# Patient Record
Sex: MALE | Race: WHITE | NOT HISPANIC OR LATINO | Employment: FULL TIME | URBAN - METROPOLITAN AREA
[De-identification: names, ages, dates, MRNs, and addresses within clinical notes are randomized per-mention and may not be internally consistent; named-entity substitution may affect disease eponyms.]

---

## 2017-04-09 ENCOUNTER — HOSPITAL ENCOUNTER (EMERGENCY)
Facility: CLINIC | Age: 57
Discharge: HOME | End: 2017-04-09
Attending: EMERGENCY MEDICINE | Admitting: EMERGENCY MEDICINE

## 2017-04-09 ENCOUNTER — RECORDS - HEALTHEAST (OUTPATIENT)
Dept: ADMINISTRATIVE | Facility: OTHER | Age: 57
End: 2017-04-09

## 2017-04-09 VITALS
OXYGEN SATURATION: 93 % | SYSTOLIC BLOOD PRESSURE: 116 MMHG | TEMPERATURE: 98.1 F | BODY MASS INDEX: 21.4 KG/M2 | DIASTOLIC BLOOD PRESSURE: 77 MMHG | WEIGHT: 160 LBS | RESPIRATION RATE: 18 BRPM

## 2017-04-09 DIAGNOSIS — S01.81XA FACIAL LACERATION, INITIAL ENCOUNTER: ICD-10-CM

## 2017-04-09 PROCEDURE — 12011 RPR F/E/E/N/L/M 2.5 CM/<: CPT | Performed by: EMERGENCY MEDICINE

## 2017-04-09 PROCEDURE — 90471 IMMUNIZATION ADMIN: CPT

## 2017-04-09 PROCEDURE — 90715 TDAP VACCINE 7 YRS/> IM: CPT | Performed by: EMERGENCY MEDICINE

## 2017-04-09 PROCEDURE — 99282 EMERGENCY DEPT VISIT SF MDM: CPT | Mod: 25

## 2017-04-09 PROCEDURE — 12011 RPR F/E/E/N/L/M 2.5 CM/<: CPT

## 2017-04-09 PROCEDURE — 27210282 ZZH ADHESIVE DERMABOND SKIN

## 2017-04-09 PROCEDURE — 99282 EMERGENCY DEPT VISIT SF MDM: CPT | Mod: 25 | Performed by: EMERGENCY MEDICINE

## 2017-04-09 PROCEDURE — 25000125 ZZHC RX 250: Performed by: EMERGENCY MEDICINE

## 2017-04-09 RX ADMIN — CLOSTRIDIUM TETANI TOXOID ANTIGEN (FORMALDEHYDE INACTIVATED), CORYNEBACTERIUM DIPHTHERIAE TOXOID ANTIGEN (FORMALDEHYDE INACTIVATED), BORDETELLA PERTUSSIS TOXOID ANTIGEN (GLUTARALDEHYDE INACTIVATED), BORDETELLA PERTUSSIS FILAMENTOUS HEMAGGLUTININ ANTIGEN (FORMALDEHYDE INACTIVATED), BORDETELLA PERTUSSIS PERTACTIN ANTIGEN, AND BORDETELLA PERTUSSIS FIMBRIAE 2/3 ANTIGEN 0.5 ML: 5; 2; 2.5; 5; 3; 5 INJECTION, SUSPENSION INTRAMUSCULAR at 22:26

## 2017-04-09 NOTE — ED AVS SNAPSHOT
[facility] Emergency Department    [address] [address]    Phone:  [phone]    Fax:  [phone]                                       Ronal Talley   MRN: [id number]    Department:  [facility] Emergency Department   Date of Visit:  4/9/2017           Patient Information     Date Of Birth          1960        Your diagnoses for this visit were:     Facial laceration, initial encounter        You were seen by Wally Lazo MD and Jason Gimenez MD.        Discharge Instructions         Face Laceration: Skin Glue  A laceration is a cut through the skin. A laceration on your face has been closed with skin glue. This is used on cuts that have smooth edges and are not infected. In some cases, a lower layer of skin may be sutured before skin glue is put on. The skin glue closes the cut within a few minutes. It also provides a water-resistant cover. No bandage is needed. Skin glue peels off on its own within 5 to 10 days.     Home care    Your healthcare provider may prescribe an antibiotic. This is to help prevent infection. Follow all instructions for taking this medicine. Take the medicine every day until it is gone or you are told to stop. You should not have any left over.    The healthcare provider may prescribe medicines for pain. Follow instructions for taking them.    Follow the healthcare provider s instructions on how to care for the cut.    Keep the wound clean and dry. You may shower or bathe as usual, but do not use soaps, lotions, or ointments on the wound area. Do not scrub the wound. After bathing, pat the wound dry with a soft towel. Avoid soaking the cut in water.    Do not scratch, rub, or pick at the film. Do not place tape directly over the film.    Do not apply liquids (such as peroxide), ointments, or creams to the wound while the film is in place.    Most facial skin wounds heal without problems. However, an infection sometimes occurs despite  proper treatment. Therefore, watch for the signs of infection listed below.  Follow-up care  Follow up with your health care provider as advised. Stitches should be removed from the face within 5 days. Stitches and staples should be removed from other parts of the body within 7-14 days. If dissolving stitches were used in the mouth, these will fall out or dissolve without the need for removal. If tape closures were used, remove them yourself if they have not fallen off after 7 days. If skin glue was used, the film will fall off by itself in 5-10 days. Notify your healthcare provider if you notice persistent numbness or weakness in the injured hand.  When to seek medical advice  Call your health care provider right away if any of these occur:    Wound bleeds more than a small amount or bleeding doesn't stop    Signs of infection:    Increasing pain in the wound    Increasing wound redness or swelling    Pus or bad odor coming from the wound    Fever of 100.4 F (38. C) or as directed by your healthcare provider    Wound edges re-open    1611-4995 [employer]. [address] [place] [address]. All rights reserved. This information is not intended as a substitute for professional medical care. Always follow your healthcare professional's instructions.          24 Hour Appointment Hotline       To make an appointment at any [facility], call [phone] ([phone]). If you don't have a family doctor or clinic, we will help you find one. [facility] clinics are conveniently located to serve the needs of you and your family.             Review of your medicines      Our records show that you are taking the medicines listed below. If these are incorrect, please call your family doctor or clinic.        Dose / Directions Last dose taken    NO ACTIVE MEDICATIONS        .   Refills:  0        PERCOCET  MG per tablet   Dose:  1 tablet   Generic drug:  oxyCODONE-acetaminophen        take 1  "Tab by mouth every 4 hours as needed.   Refills:  0                Orders Needing Specimen Collection     None      Pending Results     No orders found from 2017 to 4/10/2017.            Pending Culture Results     No orders found from 2017 to 4/10/2017.            Test Results From Your Hospital Stay               Thank you for choosing [facility]       Thank you for choosing [facility] for your care. Our goal is always to provide you with excellent care. Hearing back from our patients is one way we can continue to improve our services. Please take a few minutes to complete the written survey that you may receive in the mail after you visit with us. Thank you!        [employer] Information     [employer] lets you send messages to your doctor, view your test results, renew your prescriptions, schedule appointments and more. To sign up, go to [email or web address] . Click on \"Log in\" on the left side of the screen, which will take you to the Welcome page. Then click on \"Sign up Now\" on the right side of the page.     You will be asked to enter the access code listed below, as well as some personal information. Please follow the directions to create your username and password.     Your access code is: [email or web address]  Expires: 2017 10:37 PM     Your access code will  in 90 days. If you need help or a new code, please call your [facility] clinic or [phone].        Care EveryWhere ID     This is your Care EveryWhere ID. This could be used by other organizations to access your [facility] medical records  [id number]        After Visit Summary       This is your record. Keep this with you and show to your community pharmacist(s) and doctor(s) at your next visit.                  "

## 2017-04-09 NOTE — ED AVS SNAPSHOT
[facility] Emergency Department    [address] [address]    Phone:  [phone]    Fax:  [phone]                                       Ronal Talley   MRN: [id number]    Department:  [facility] Emergency Department   Date of Visit:  4/9/2017           After Visit Summary Signature Page     I have received my discharge instructions, and my questions have been answered. I have discussed any challenges I see with this plan with the nurse or doctor.    ..........................................................................................................................................  Patient/Patient Representative Signature      ..........................................................................................................................................  Patient Representative Print Name and Relationship to Patient    ..................................................               ................................................  Date                                            Time    ..........................................................................................................................................  Reviewed by Signature/Title    ...................................................              ..............................................  Date                                                            Time

## 2017-04-10 ASSESSMENT — ENCOUNTER SYMPTOMS
WOUND: 1
EYE PAIN: 0
HEADACHES: 0
EYE REDNESS: 0

## 2017-04-10 NOTE — ED PROVIDER NOTES
History     Chief Complaint   Patient presents with     Facial Laceration     laceration over left eye hitting head on storage bins, no loc.     HPI  Ronal Talley is a 56 year old male who tripped and fell landing on a round plastic bucket sustaining a left eyebrow area laceration.  No blunt eye trauma.  Eye glasses were not broken.  No LOC.  No neck or back pain or injury.    Tetanus immunization status: Not up-to-date.    I have reviewed the Medications, Allergies, Past Medical and Surgical History, and Social History in the Epic system.  Patient Active Problem List   Diagnosis     FAMILY HISTORY OF PROSTATE MALIGNANCY     Headache     Impingement syndrome, shoulder     Onychomycosis     OA (osteoarthritis)     CARDIOVASCULAR SCREENING; LDL GOAL LESS THAN 160     Past Medical History:   Diagnosis Date     NO ACTIVE PROBLEMS      Past Surgical History:   Procedure Laterality Date     NO HISTORY OF SURGERY       No current facility-administered medications for this encounter.      Current Outpatient Prescriptions   Medication     oxycodone-acetaminophen (PERCOCET)  MG per tablet     NO ACTIVE MEDICATIONS     Allergies   Allergen Reactions     Cipro [Ciprofloxacin] Rash     Penicillins Anaphylaxis     Social History   Substance Use Topics     Smoking status: Never Smoker     Smokeless tobacco: Not on file     Alcohol use Yes      Comment: occasionally     Family History   Problem Relation Age of Onset     C.A.D. No family hx of      DIABETES No family hx of      CEREBROVASCULAR DISEASE No family hx of      Cancer - colorectal No family hx of      Hypertension Mother      Breast Cancer Mother      Prostate Cancer Father        Review of Systems   Eyes: Negative for pain, redness and visual disturbance.   Skin: Positive for wound (left eyebrow laceration). Negative for pallor.   Neurological: Negative for headaches.       Physical Exam   BP: 116/77  Heart Rate: 64  Temp: 98.1  F (36.7  C)  Resp: 18  Weight:  72.6 kg (160 lb)  SpO2: 93 %  Physical Exam   Constitutional: He is oriented to person, place, and time. He appears well-developed and well-nourished. No distress.   HENT:   Head: Normocephalic. Head is with laceration (1.5 cm partial-thickness left eyebrow laceration, no associated eye/globe trauma). Head is without raccoon's eyes, without Curiel's sign, without abrasion and without contusion.   Eyes: Conjunctivae and EOM are normal. Pupils are equal, round, and reactive to light. No scleral icterus.       Neck: Normal range of motion. Neck supple.   Neurological: He is alert and oriented to person, place, and time. GCS eye subscore is 4. GCS verbal subscore is 5. GCS motor subscore is 6.   Nursing note and vitals reviewed.      ED Course     ED Course     Laceration repair  Performed by: CAIO NARANJO  Authorized by: CAIO NARANJO   Consent: Verbal consent obtained.  Risks and benefits: risks, benefits and alternatives were discussed  Consent given by: patient  Patient understanding: patient states understanding of the procedure being performed  Patient consent: the patient's understanding of the procedure matches consent given  Laceration length: 1.5 cm  Foreign bodies: no foreign bodies  Preparation: Patient was prepped and draped in the usual sterile fashion.  Irrigation solution: saline  Amount of cleaning: standard  Debridement: none  Degree of undermining: none  Skin closure: glue  Approximation: close  Approximation difficulty: simple  Patient tolerance: Patient tolerated the procedure well with no immediate complications                     Labs Ordered and Resulted from Time of ED Arrival Up to the Time of Departure from the ED - No data to display    Medications   Tdap (tetanus-diphtheria-acell pertussis) (ADACEL) injection 0.5 mL (0.5 mLs Intramuscular Given 4/9/17 6519)       Assessments & Plan (with Medical Decision Making)   Left eyebrow area laceration without blunt eye/globe trauma.  Wound  closed with glue, tetanus booster updated, neurologically intact, GCS 15. Patient was provided instructions for supportive care and will return as needed for worsened condition or worsening symptoms, or new problems or concerns.    I have reviewed the nursing notes.    I have reviewed the findings, diagnosis, plan and need for follow up with the patient.    Discharge Medication List as of 4/9/2017 10:38 PM          Final diagnoses:   Facial laceration, initial encounter       4/9/2017   [facility] EMERGENCY DEPARTMENT     Jason Gimenez MD  04/10/17 0121

## 2017-04-10 NOTE — DISCHARGE INSTRUCTIONS
Face Laceration: Skin Glue  A laceration is a cut through the skin. A laceration on your face has been closed with skin glue. This is used on cuts that have smooth edges and are not infected. In some cases, a lower layer of skin may be sutured before skin glue is put on. The skin glue closes the cut within a few minutes. It also provides a water-resistant cover. No bandage is needed. Skin glue peels off on its own within 5 to 10 days.     Home care    Your healthcare provider may prescribe an antibiotic. This is to help prevent infection. Follow all instructions for taking this medicine. Take the medicine every day until it is gone or you are told to stop. You should not have any left over.    The healthcare provider may prescribe medicines for pain. Follow instructions for taking them.    Follow the healthcare provider s instructions on how to care for the cut.    Keep the wound clean and dry. You may shower or bathe as usual, but do not use soaps, lotions, or ointments on the wound area. Do not scrub the wound. After bathing, pat the wound dry with a soft towel. Avoid soaking the cut in water.    Do not scratch, rub, or pick at the film. Do not place tape directly over the film.    Do not apply liquids (such as peroxide), ointments, or creams to the wound while the film is in place.    Most facial skin wounds heal without problems. However, an infection sometimes occurs despite proper treatment. Therefore, watch for the signs of infection listed below.  Follow-up care  Follow up with your health care provider as advised. Stitches should be removed from the face within 5 days. Stitches and staples should be removed from other parts of the body within 7-14 days. If dissolving stitches were used in the mouth, these will fall out or dissolve without the need for removal. If tape closures were used, remove them yourself if they have not fallen off after 7 days. If skin glue was used, the film will fall off by itself  in 5-10 days. Notify your healthcare provider if you notice persistent numbness or weakness in the injured hand.  When to seek medical advice  Call your health care provider right away if any of these occur:    Wound bleeds more than a small amount or bleeding doesn't stop    Signs of infection:    Increasing pain in the wound    Increasing wound redness or swelling    Pus or bad odor coming from the wound    Fever of 100.4 F (38. C) or as directed by your healthcare provider    Wound edges re-open    7558-0428 [employer]. [address] [place], [place] [id number]. All rights reserved. This information is not intended as a substitute for professional medical care. Always follow your healthcare professional's instructions.

## 2018-12-18 ENCOUNTER — RECORDS - HEALTHEAST (OUTPATIENT)
Dept: ADMINISTRATIVE | Facility: OTHER | Age: 58
End: 2018-12-18

## 2019-11-12 ENCOUNTER — OFFICE VISIT (OUTPATIENT)
Dept: FAMILY MEDICINE | Facility: CLINIC | Age: 59
End: 2019-11-12
Payer: COMMERCIAL

## 2019-11-12 VITALS
DIASTOLIC BLOOD PRESSURE: 76 MMHG | HEIGHT: 73 IN | WEIGHT: 160 LBS | OXYGEN SATURATION: 98 % | SYSTOLIC BLOOD PRESSURE: 118 MMHG | TEMPERATURE: 96.5 F | HEART RATE: 58 BPM | BODY MASS INDEX: 21.2 KG/M2

## 2019-11-12 DIAGNOSIS — M25.579 ARTHRALGIA OF FOOT, UNSPECIFIED LATERALITY: ICD-10-CM

## 2019-11-12 DIAGNOSIS — Z12.11 SPECIAL SCREENING FOR MALIGNANT NEOPLASMS, COLON: ICD-10-CM

## 2019-11-12 DIAGNOSIS — M54.2 NECK PAIN: Primary | ICD-10-CM

## 2019-11-12 DIAGNOSIS — Z13.6 CARDIOVASCULAR SCREENING; LDL GOAL LESS THAN 100: ICD-10-CM

## 2019-11-12 DIAGNOSIS — Z12.5 SCREENING FOR PROSTATE CANCER: ICD-10-CM

## 2019-11-12 DIAGNOSIS — M62.89 MUSCLE TIGHTNESS: ICD-10-CM

## 2019-11-12 DIAGNOSIS — Z97.4 WEARS HEARING AID IN BOTH EARS: ICD-10-CM

## 2019-11-12 DIAGNOSIS — Z13.1 SCREENING FOR DIABETES MELLITUS: ICD-10-CM

## 2019-11-12 LAB
CHOLEST SERPL-MCNC: 187 MG/DL
GLUCOSE SERPL-MCNC: 104 MG/DL (ref 70–99)
HDLC SERPL-MCNC: 80 MG/DL
LDLC SERPL CALC-MCNC: 97 MG/DL
NONHDLC SERPL-MCNC: 107 MG/DL
PSA SERPL-ACNC: 0.6 UG/L (ref 0–4)
TRIGL SERPL-MCNC: 51 MG/DL
URATE SERPL-MCNC: 3.6 MG/DL (ref 3.5–7.2)

## 2019-11-12 PROCEDURE — 80061 LIPID PANEL: CPT | Performed by: FAMILY MEDICINE

## 2019-11-12 PROCEDURE — 36415 COLL VENOUS BLD VENIPUNCTURE: CPT | Performed by: FAMILY MEDICINE

## 2019-11-12 PROCEDURE — 99203 OFFICE O/P NEW LOW 30 MIN: CPT | Performed by: FAMILY MEDICINE

## 2019-11-12 PROCEDURE — G0103 PSA SCREENING: HCPCS | Performed by: FAMILY MEDICINE

## 2019-11-12 PROCEDURE — 84550 ASSAY OF BLOOD/URIC ACID: CPT | Performed by: FAMILY MEDICINE

## 2019-11-12 PROCEDURE — 82947 ASSAY GLUCOSE BLOOD QUANT: CPT | Performed by: FAMILY MEDICINE

## 2019-11-12 RX ORDER — CYCLOBENZAPRINE HCL 10 MG
10 TABLET ORAL 3 TIMES DAILY PRN
Qty: 30 TABLET | Refills: 1 | Status: SHIPPED | OUTPATIENT
Start: 2019-11-12 | End: 2022-04-15

## 2019-11-12 RX ORDER — INDOMETHACIN 50 MG/1
50 CAPSULE ORAL
Qty: 30 CAPSULE | Refills: 3 | Status: SHIPPED | OUTPATIENT
Start: 2019-11-12 | End: 2022-04-15

## 2019-11-12 ASSESSMENT — MIFFLIN-ST. JEOR: SCORE: 1576.7

## 2019-11-12 NOTE — PROGRESS NOTES
"Subjective     Ronal Talley is a 59 year old male who presents to clinic today for the following health issues:  Chief Complaint   Patient presents with     New Patient     Neck Pain     Gout     left gtr toe. Had a bad flare this summer. He did some diet changes-pain is better now.     Health Maintenance     due for colonoscopy.  Declined flu vaccine when offered     Patient Request     wants PSA-father had prostate cancer.  Is also fasting.     He felt like he had a gout flare up in his toe.  Felt and looking like when he researched it.  HPI   Neck Pain      Duration: September-acute flare    Description:  Location: mostly on the left side of neck   Radiation: into the left neck and nto the left shoulder    Intensity:  8-9/10 with a wrong move    Accompanying signs and symptoms: none    History (similar episodes/previous evaluation): nothing recent.     Precipitating or alleviating factors: None    Therapies tried and outcome: ice and heat. Stretching and Tylenol and ibuprofen. With little help    Started out of the blue.  No trauma no overuse.  He reached down and it triggered the pain in the left side of his neck.  It has not gone away.  Having issues with turing his head to the right.  Some times radiates up and down.  No radiation into the shoulders.  Has pain with activity.  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 No neck trauma.  He is very activity.    No other numbness tingling or weakness.      Reviewed and updated as needed this visit by Provider         Review of Systems   Review Of Systems  Skin:   Eyes:   Ears/Nose/Throat:   Respiratory: neg  Cardiovascular: neg  Gastrointestinal:   Genitourinary:   Musculoskeletal: as above  Neurologic: as above  Psychiatric: negative  Hematologic/Lymphatic/Immunologic:   Endocrine:         Objective    /76 (Cuff Size: Adult Regular)   Pulse 58   Temp 96.5  F (35.8  C) (Tympanic)   Ht 1.842 m (6' 0.5\")   Wt 72.6 kg (160 lb)   SpO2 98%   BMI 21.40 kg/m    Body " mass index is 21.4 kg/m .  Physical Exam   GENERAL APPEARANCE: healthy, alert and no distress  RESP: lungs clear to auscultation - no rales, rhonchi or wheezes  CV: regular rates and rhythm, normal S1 S2, no S3 or S4 and no murmur, click or rub  MS: has decrease rom with rotation to the right, has full flexion and extension  SKIN: no suspicious lesions or rashes  NEURO: reflexes 2+ symmetric bilaterally at the biceps, triceps and brachioradialis    PSYCH: mentation appears normal and affect normal/bright            Assessment & Plan     (M54.2) Neck pain  (primary encounter diagnosis)  Comment: at this point going to do PT, use medication, and if not better will get imaging  Plan: PHYSICAL THERAPY REFERRAL, cyclobenzaprine         (FLEXERIL) 10 MG tablet            (M62.89) Muscle tightness  Comment: use muscle relaxant  Plan: cyclobenzaprine (FLEXERIL) 10 MG tablet            (M25.579) Arthralgia of foot, unspecified laterality  Comment: use med for flare up and check a uric acid to see if high or high normal  Plan: indomethacin (INDOCIN) 50 MG capsule, Uric acid        He already has read up on dietary changes    (Z13.6) CARDIOVASCULAR SCREENING; LDL GOAL LESS THAN 100  Comment:   Plan: Lipid panel reflex to direct LDL Fasting            (Z13.1) Screening for diabetes mellitus  Comment:   Plan: Glucose            (Z12.11) Special screening for malignant neoplasms, colon  Comment:   Plan: GASTROENTEROLOGY ADULT REF PROCEDURE ONLY [facility] [phone]; [facility]         Surgeon            (Z12.5) Screening for prostate cancer  Comment:   Plan: Prostate spec antigen screen                 See Patient Instructions    Return in about 4 weeks (around 12/10/2019) for If not better.    Juan Shelby MD  [facility]

## 2019-11-12 NOTE — LETTER
November 12, 2019      Ronal Talley  [address] [address]        Dear ,    We are writing to inform you of your test results.    He has a low uric acid which is good.   No signs of prostate cancer.   No signs of diabetes.  He has a little glucose intolerance; no diabetes.   Normal is up to 100 and his glucose was 104.   He has normal cholesterol.     Resulted Orders   Prostate spec antigen screen   Result Value Ref Range    PSA 0.60 0 - 4 ug/L      Comment:      Assay Method:  Chemiluminescence using Siemens Vista analyzer   Lipid panel reflex to direct LDL Fasting   Result Value Ref Range    Cholesterol 187 <200 mg/dL    Triglycerides 51 <150 mg/dL    HDL Cholesterol 80 >39 mg/dL    LDL Cholesterol Calculated 97 <100 mg/dL      Comment:      Desirable:       <100 mg/dl    Non HDL Cholesterol 107 <130 mg/dL   Glucose   Result Value Ref Range    Glucose 104 (H) 70 - 99 mg/dL   Uric acid   Result Value Ref Range    Uric Acid 3.6 3.5 - 7.2 mg/dL                       If you have any questions or concerns, please call the clinic at the number listed above.       Sincerely,        Juan Shelby MD

## 2019-11-12 NOTE — PATIENT INSTRUCTIONS
Please go to lab.    Please get your colonoscopy done.    Please go to physical therapy.    I did sent through indomethacin to help with an joint pain flare up thinking it is likely gout based on your history.    I am also sending a muscle relaxant to help with your neck.    If you do physical therapy and you are not getting better I would get further imaging of your neck such as an MRI.          Thank you for choosing [facility].  You may be receiving an email and/or telephone survey request from [facility] [employer] regarding your visit today.  Please take a few minutes to respond to the survey to let us know how we are doing.      If you have questions or concerns, please contact us via [employer] or you can contact your care team at [phone].    Our Clinic hours are:  Monday 6:40 am  to 7:00 pm  Tuesday -Friday 6:40 am to 5:00 pm    The [facility] outpatient lab hours are:  Monday - Friday 6:10 am to 4:45 pm  Saturdays 7:00 am to 11:00 am  Appointments are required, call [phone]    If you have clinical questions after hours or would like to schedule an appointment,  call the clinic at [phone].

## 2020-01-27 ENCOUNTER — ANESTHESIA EVENT (OUTPATIENT)
Dept: GASTROENTEROLOGY | Facility: CLINIC | Age: 60
End: 2020-01-27
Payer: COMMERCIAL

## 2020-01-27 NOTE — ANESTHESIA PREPROCEDURE EVALUATION
Anesthesia Pre-Procedure Evaluation    Patient: Ronal Talley   MRN: [id number] : 1960          Preoperative Diagnosis: Special screening for malignant neoplasms, colon [Z12.11]    Procedure(s):  COLONOSCOPY    Past Medical History:   Diagnosis Date     NO ACTIVE PROBLEMS      Past Surgical History:   Procedure Laterality Date     NO HISTORY OF SURGERY       SURGICAL HISTORY OF -       left clavicle surgery with a plate in his 50s     SURGICAL HISTORY OF -       right ankle scope     SURGICAL HISTORY OF -       appendix removed in the early        Anesthesia Evaluation     . Pt has had prior anesthetic. Type: General    No history of anesthetic complications          ROS/MED HX    ENT/Pulmonary:     (+)other ENT- [place], hearing aides, , . .    Neurologic:  - neg neurologic ROS     Cardiovascular:     (+) Dyslipidemia, ----. : . . . :. .       METS/Exercise Tolerance:  >4 METS   Hematologic:  - neg hematologic  ROS       Musculoskeletal:   (+) arthritis,  other musculoskeletal- Neck pain      GI/Hepatic:  - neg GI/hepatic ROS       Renal/Genitourinary:  - ROS Renal section negative       Endo:  - neg endo ROS       Psychiatric:  - neg psychiatric ROS       Infectious Disease:  - neg infectious disease ROS       Malignancy:      - no malignancy   Other:    - neg other ROS                      Physical Exam  Normal systems: cardiovascular, pulmonary and dental    Airway   Mallampati: I  TM distance: >3 FB  Neck ROM: full    Dental     Cardiovascular       Pulmonary             Lab Results   Component Value Date    WBC 5.2 2006    HGB 15.4 2010    HCT 46.7 2006     2006     (H) 2019    TSH 0.47 2006       Preop Vitals  BP Readings from Last 3 Encounters:   19 118/76   17 116/77   09/13/10 100/64    Pulse Readings from Last 3 Encounters:   19 58   09/13/10 58   10/06/09 68      Resp Readings from Last 3 Encounters:   17 18   08 12  "   SpO2 Readings from Last 3 Encounters:   11/12/19 98%   04/09/17 93%      Temp Readings from Last 1 Encounters:   11/12/19 35.8  C (96.5  F) (Tympanic)    Ht Readings from Last 1 Encounters:   11/12/19 1.842 m (6' 0.5\")      Wt Readings from Last 1 Encounters:   11/12/19 72.6 kg (160 lb)    Estimated body mass index is 21.4 kg/m  as calculated from the following:    Height as of 11/12/19: 1.842 m (6' 0.5\").    Weight as of 11/12/19: 72.6 kg (160 lb).       Anesthesia Plan      History & Physical Review  History and physical reviewed and following examination; no interval change.    ASA Status:  2 .    NPO Status:  > 6 hours    Plan for MAC Reason for MAC:  Deep or markedly invasive procedure (G8)         Postoperative Care      Consents  Anesthetic plan, risks, benefits and alternatives discussed with:  Patient..                 ANA Holley CRNA  "

## 2020-01-31 ENCOUNTER — HOSPITAL ENCOUNTER (OUTPATIENT)
Facility: CLINIC | Age: 60
Discharge: HOME | End: 2020-01-31
Attending: SURGERY | Admitting: SURGERY
Payer: COMMERCIAL

## 2020-01-31 ENCOUNTER — ANESTHESIA (OUTPATIENT)
Dept: GASTROENTEROLOGY | Facility: CLINIC | Age: 60
End: 2020-01-31
Payer: COMMERCIAL

## 2020-01-31 VITALS
SYSTOLIC BLOOD PRESSURE: 127 MMHG | HEART RATE: 54 BPM | BODY MASS INDEX: 21.4 KG/M2 | RESPIRATION RATE: 16 BRPM | WEIGHT: 160 LBS | DIASTOLIC BLOOD PRESSURE: 89 MMHG | TEMPERATURE: 98 F | OXYGEN SATURATION: 100 %

## 2020-01-31 LAB — COLONOSCOPY: NORMAL

## 2020-01-31 PROCEDURE — G0121 COLON CA SCRN NOT HI RSK IND: HCPCS | Performed by: SURGERY

## 2020-01-31 PROCEDURE — 25000125 ZZHC RX 250: Performed by: NURSE ANESTHETIST, CERTIFIED REGISTERED

## 2020-01-31 PROCEDURE — 25800030 ZZH RX IP 258 OP 636: Performed by: SURGERY

## 2020-01-31 PROCEDURE — 45378 DIAGNOSTIC COLONOSCOPY: CPT | Performed by: SURGERY

## 2020-01-31 PROCEDURE — 37000008 ZZH ANESTHESIA TECHNICAL FEE, 1ST 30 MIN: Performed by: SURGERY

## 2020-01-31 PROCEDURE — 25000125 ZZHC RX 250: Performed by: SURGERY

## 2020-01-31 RX ORDER — LIDOCAINE HYDROCHLORIDE 10 MG/ML
INJECTION, SOLUTION INFILTRATION; PERINEURAL PRN
Status: DISCONTINUED | OUTPATIENT
Start: 2020-01-31 | End: 2020-01-31

## 2020-01-31 RX ORDER — ONDANSETRON 2 MG/ML
4 INJECTION INTRAMUSCULAR; INTRAVENOUS
Status: DISCONTINUED | OUTPATIENT
Start: 2020-01-31 | End: 2020-01-31 | Stop reason: HOSPADM

## 2020-01-31 RX ORDER — GLYCOPYRROLATE 0.2 MG/ML
INJECTION, SOLUTION INTRAMUSCULAR; INTRAVENOUS PRN
Status: DISCONTINUED | OUTPATIENT
Start: 2020-01-31 | End: 2020-01-31

## 2020-01-31 RX ORDER — SODIUM CHLORIDE, SODIUM LACTATE, POTASSIUM CHLORIDE, CALCIUM CHLORIDE 600; 310; 30; 20 MG/100ML; MG/100ML; MG/100ML; MG/100ML
INJECTION, SOLUTION INTRAVENOUS CONTINUOUS
Status: DISCONTINUED | OUTPATIENT
Start: 2020-01-31 | End: 2020-01-31 | Stop reason: HOSPADM

## 2020-01-31 RX ORDER — LIDOCAINE 40 MG/G
CREAM TOPICAL
Status: DISCONTINUED | OUTPATIENT
Start: 2020-01-31 | End: 2020-01-31 | Stop reason: HOSPADM

## 2020-01-31 RX ORDER — PROPOFOL 10 MG/ML
INJECTION, EMULSION INTRAVENOUS CONTINUOUS PRN
Status: DISCONTINUED | OUTPATIENT
Start: 2020-01-31 | End: 2020-01-31

## 2020-01-31 RX ADMIN — SODIUM CHLORIDE, POTASSIUM CHLORIDE, SODIUM LACTATE AND CALCIUM CHLORIDE: 600; 310; 30; 20 INJECTION, SOLUTION INTRAVENOUS at 08:32

## 2020-01-31 RX ADMIN — LIDOCAINE HYDROCHLORIDE 1 ML: 10 INJECTION, SOLUTION EPIDURAL; INFILTRATION; INTRACAUDAL; PERINEURAL at 08:32

## 2020-01-31 RX ADMIN — GLYCOPYRROLATE 0.1 MG: 0.2 INJECTION, SOLUTION INTRAMUSCULAR; INTRAVENOUS at 09:09

## 2020-01-31 RX ADMIN — PROPOFOL 200 MCG/KG/MIN: 10 INJECTION, EMULSION INTRAVENOUS at 09:09

## 2020-01-31 RX ADMIN — LIDOCAINE HYDROCHLORIDE 30 MG: 10 INJECTION, SOLUTION INFILTRATION; PERINEURAL at 09:10

## 2020-01-31 RX ADMIN — GLYCOPYRROLATE 0.1 MG: 0.2 INJECTION, SOLUTION INTRAMUSCULAR; INTRAVENOUS at 09:08

## 2020-01-31 NOTE — ANESTHESIA POSTPROCEDURE EVALUATION
Patient: Ronal SCOTT Gerri    Procedure(s):  COLONOSCOPY    Diagnosis:Special screening for malignant neoplasms, colon [Z12.11]  Diagnosis Additional Information: No value filed.    Anesthesia Type:  MAC    Note:  Anesthesia Post Evaluation    Patient location during evaluation: Phase 2  Patient participation: Able to fully participate in evaluation  Level of consciousness: awake and alert  Pain management: adequate  Airway patency: patent  Cardiovascular status: acceptable and hemodynamically stable  Respiratory status: acceptable, room air and spontaneous ventilation  Hydration status: acceptable  PONV: none     Anesthetic complications: None          Last vitals:  Vitals:    01/31/20 0825   BP: 107/82   Resp: 16   Temp: 36.7  C (98  F)   SpO2: 98%         Electronically Signed By: Boni Jasso CRNA, APRN CRNA  January 31, 2020  9:14 AM

## 2020-01-31 NOTE — ANESTHESIA CARE TRANSFER NOTE
Patient: Ronal SCOTT Gerri    Procedure(s):  COLONOSCOPY    Diagnosis: Special screening for malignant neoplasms, colon [Z12.11]  Diagnosis Additional Information: No value filed.    Anesthesia Type:   MAC     Note:  Airway :Nasal Cannula  Patient transferred to:Phase II  Handoff Report: Identifed the Patient, Identified the Reponsible Provider, Reviewed the pertinent medical history, Discussed the surgical course, Reviewed Intra-OP anesthesia mangement and issues during anesthesia, Set expectations for post-procedure period and Allowed opportunity for questions and acknowledgement of understanding      Vitals: (Last set prior to Anesthesia Care Transfer)    CRNA VITALS  1/31/2020 0902 - 1/31/2020 0932      1/31/2020             Pulse:  74    SpO2:  98 %                Electronically Signed By: Boni Jasso CRNA, APRN CRNA  January 31, 2020  9:32 AM

## 2020-01-31 NOTE — H&P
59 year old year old male here for colonoscopy for screening.    Patient Active Problem List   Diagnosis     FAMILY HISTORY OF PROSTATE MALIGNANCY     Headache     Impingement syndrome, shoulder     Onychomycosis     OA (osteoarthritis)     CARDIOVASCULAR SCREENING; LDL GOAL LESS THAN 160     Wears hearing aid in both ears     Arthralgia of foot, unspecified laterality     Neck pain     Muscle tightness       Past Medical History:   Diagnosis Date     NO ACTIVE PROBLEMS        Past Surgical History:   Procedure Laterality Date     NO HISTORY OF SURGERY       SURGICAL HISTORY OF -       left clavicle surgery with a plate in his 50s     SURGICAL HISTORY OF -       right ankle scope     SURGICAL HISTORY OF -       appendix removed in the early 1980s       @FMX@    No current outpatient medications on file.       Allergies   Allergen Reactions     Cipro [Ciprofloxacin] Rash     Penicillins Anaphylaxis       Pt reports that he has never smoked. He has never used smokeless tobacco. He reports current alcohol use. He reports that he does not use drugs.    Exam:  There were no vitals taken for this visit.    Awake, Alert OX3  Lungs - CTA bilaterally  CV - RRR, no murmurs, distal pulses intact  Abd - soft, non-distended, non-tender, +BS  Extr - No cyanosis or edema    A/P 59 year old year old male in need of colonoscopy for screening. Risks, benefits, alternatives, and complications were discussed including the possibility of perforation and the patient agreed to proceed    Ronal Norht MD

## 2020-02-08 ENCOUNTER — HEALTH MAINTENANCE LETTER (OUTPATIENT)
Age: 60
End: 2020-02-08

## 2020-11-08 ENCOUNTER — HEALTH MAINTENANCE LETTER (OUTPATIENT)
Age: 60
End: 2020-11-08

## 2020-12-15 ENCOUNTER — COMMUNICATION - HEALTHEAST (OUTPATIENT)
Dept: FAMILY MEDICINE | Facility: CLINIC | Age: 60
End: 2020-12-15

## 2021-01-08 ENCOUNTER — RECORDS - HEALTHEAST (OUTPATIENT)
Dept: LAB | Facility: CLINIC | Age: 61
End: 2021-01-08

## 2021-01-08 LAB
PSA SERPL-MCNC: 0.7 NG/ML (ref 0–4.5)
T4 FREE SERPL-MCNC: 1 NG/DL (ref 0.7–1.8)
TSH SERPL DL<=0.005 MIU/L-ACNC: 0.25 UIU/ML (ref 0.3–5)

## 2021-03-28 ENCOUNTER — HEALTH MAINTENANCE LETTER (OUTPATIENT)
Age: 61
End: 2021-03-28

## 2021-06-13 NOTE — TELEPHONE ENCOUNTER
New Appointment Needed  What is the reason for the visit:    New Patient - Pos Thyroid Mass  Provider Preference: Dr. Mc  How soon do you need to be seen?: as soon as possible  Waitlist offered?: No  Okay to leave a detailed message:  Yes

## 2021-09-11 ENCOUNTER — HEALTH MAINTENANCE LETTER (OUTPATIENT)
Age: 61
End: 2021-09-11

## 2022-03-22 ENCOUNTER — LAB REQUISITION (OUTPATIENT)
Dept: LAB | Facility: CLINIC | Age: 62
End: 2022-03-22

## 2022-03-22 ENCOUNTER — MEDICAL CORRESPONDENCE (OUTPATIENT)
Dept: HEALTH INFORMATION MANAGEMENT | Facility: CLINIC | Age: 62
End: 2022-03-22
Payer: COMMERCIAL

## 2022-03-22 DIAGNOSIS — E04.1 NONTOXIC SINGLE THYROID NODULE: ICD-10-CM

## 2022-03-22 DIAGNOSIS — Z13.1 ENCOUNTER FOR SCREENING FOR DIABETES MELLITUS: ICD-10-CM

## 2022-03-22 DIAGNOSIS — Z13.6 ENCOUNTER FOR SCREENING FOR CARDIOVASCULAR DISORDERS: ICD-10-CM

## 2022-03-22 DIAGNOSIS — Z80.42 FAMILY HISTORY OF MALIGNANT NEOPLASM OF PROSTATE: ICD-10-CM

## 2022-03-22 LAB
CHOLEST SERPL-MCNC: 183 MG/DL
FASTING STATUS PATIENT QL REPORTED: NORMAL
FASTING STATUS PATIENT QL REPORTED: NORMAL
GLUCOSE BLD-MCNC: 88 MG/DL (ref 70–125)
HDLC SERPL-MCNC: 64 MG/DL
LDLC SERPL CALC-MCNC: 108 MG/DL
PSA SERPL-MCNC: 0.35 UG/L (ref 0–4.5)
T4 FREE SERPL-MCNC: 0.96 NG/DL (ref 0.7–1.8)
TRIGL SERPL-MCNC: 56 MG/DL
TSH SERPL DL<=0.005 MIU/L-ACNC: 0.14 UIU/ML (ref 0.3–5)

## 2022-03-22 PROCEDURE — 84443 ASSAY THYROID STIM HORMONE: CPT | Performed by: FAMILY MEDICINE

## 2022-03-22 PROCEDURE — 82947 ASSAY GLUCOSE BLOOD QUANT: CPT | Performed by: FAMILY MEDICINE

## 2022-03-22 PROCEDURE — 80061 LIPID PANEL: CPT | Performed by: FAMILY MEDICINE

## 2022-03-22 PROCEDURE — 84439 ASSAY OF FREE THYROXINE: CPT | Performed by: FAMILY MEDICINE

## 2022-03-22 PROCEDURE — G0103 PSA SCREENING: HCPCS | Performed by: FAMILY MEDICINE

## 2022-03-23 ENCOUNTER — TRANSCRIBE ORDERS (OUTPATIENT)
Dept: OTHER | Age: 62
End: 2022-03-23
Payer: COMMERCIAL

## 2022-03-23 DIAGNOSIS — K40.20 NON-RECURRENT BILATERAL INGUINAL HERNIA WITHOUT OBSTRUCTION OR GANGRENE: Primary | ICD-10-CM

## 2022-04-15 ENCOUNTER — OFFICE VISIT (OUTPATIENT)
Dept: SURGERY | Facility: CLINIC | Age: 62
End: 2022-04-15
Attending: FAMILY MEDICINE
Payer: COMMERCIAL

## 2022-04-15 VITALS
DIASTOLIC BLOOD PRESSURE: 78 MMHG | WEIGHT: 165.5 LBS | SYSTOLIC BLOOD PRESSURE: 112 MMHG | BODY MASS INDEX: 21.93 KG/M2 | HEIGHT: 73 IN

## 2022-04-15 DIAGNOSIS — K40.20 NON-RECURRENT BILATERAL INGUINAL HERNIA WITHOUT OBSTRUCTION OR GANGRENE: Primary | ICD-10-CM

## 2022-04-15 PROCEDURE — 99204 OFFICE O/P NEW MOD 45 MIN: CPT | Performed by: SURGERY

## 2022-04-15 RX ORDER — OMEGA-3 FATTY ACIDS/FISH OIL 300-1000MG
CAPSULE ORAL
COMMUNITY

## 2022-04-15 RX ORDER — MULTIVIT-MIN/IRON/FOLIC ACID/K 18-600-40
CAPSULE ORAL
COMMUNITY

## 2022-04-15 NOTE — PROGRESS NOTES
"I was asked to consult on this pt by Juan Shelby for evaluation a hernia.    HPI:  This is a 61 year old male here today with concerns of pain and bulging in his Bilateral groin.  The Left side is larger.  He has noted this for the past  20 years.  The discomfort he is experiencing is a deep gnawing pain that is worse toward the end of the day.    Allergies:Penicillin v, Cipro [ciprofloxacin], Ciprofloxacin, and Penicillins    Past Medical History:   Diagnosis Date     NO ACTIVE PROBLEMS        Past Surgical History:   Procedure Laterality Date     COLONOSCOPY N/A 1/31/2020    Procedure: COLONOSCOPY;  Surgeon: Ronal North MD;  Location: [facility]     NO HISTORY OF SURGERY       SURGICAL HISTORY OF -       left clavicle surgery with a plate in his 50s     SURGICAL HISTORY OF -       right ankle scope     SURGICAL HISTORY OF -       appendix removed in the early 1980s     [facility] APPENDECTOMY      Description: Appendectomy;  Recorded: 06/10/2014;       CURRENT MEDS:  Current Outpatient Medications   Medication Sig Dispense Refill     NO ACTIVE MEDICATIONS .       omega 3 1000 MG CAPS        Vitamin D, Cholecalciferol, 25 MCG (1000 UT) TABS          Family History   Problem Relation Age of Onset     Hypertension Mother      Breast Cancer Mother      Dementia Mother      Prostate Cancer Father         age 80     Parkinsonism Father      C.A.D. No family hx of      Diabetes No family hx of      Cerebrovascular Disease No family hx of      Cancer - colorectal No family hx of         reports that he has never smoked. He has never used smokeless tobacco. He reports current alcohol use. He reports that he does not use drugs.    Review of Systems -   10 point Review of systems is negative except for; as mentioned above in HPI and PMHx    /78   Ht 1.854 m (6' 1\")   Wt 75.1 kg (165 lb 8 oz)   BMI 21.84 kg/m    Body mass index is 21.84 kg/m .    EXAM:  GENERAL: Well developed male, thin  HEENT: EOMI, Anicteric " Sclera  NECK:  No masses, good flexion and extention of the neck  CARDIAC: RRR w/out murmur   CHEST/LUNG: Clear  ABDOMEN: Bilateral inguinal hernias L > R.   GENITAL: Both testicles descended without masses  NEURO: He is ambulatory with good strength in both legs.    IMAGES: None    Assessment/Plan: Pt with a Bilateral inguinal hernia. I discussed this at length with him.  I went over conservative management as well as surgical treatment for hernias.   I would reccomend a laparoscopic inguinal hernia repair, understanding the possibility of converting to an open operation.   I went over the small risks of surgery including but not limited to bleeding and infection. I discussed the expected recovery time as well. We will schedule this hernia repair at his earliest convenience.    Gigi Hay MD  [facility]; [facility]  [phone]

## 2022-04-18 ENCOUNTER — TELEPHONE (OUTPATIENT)
Dept: SURGERY | Facility: CLINIC | Age: 62
End: 2022-04-18
Payer: COMMERCIAL

## 2022-04-23 ENCOUNTER — HEALTH MAINTENANCE LETTER (OUTPATIENT)
Age: 62
End: 2022-04-23

## 2022-05-05 ENCOUNTER — TELEPHONE (OUTPATIENT)
Dept: SURGERY | Facility: CLINIC | Age: 62
End: 2022-05-05
Payer: COMMERCIAL

## 2022-05-05 NOTE — LETTER
We've received instruction to get you scheduled for surgery with Dr Hay. We have that arranged as follows:     Surgery Date: 6/9/2022  Location: [facility]   Arrival Time: 11:00 am (Unless instructed differently by the pre-op call nurse)     Prep Instructions:     1. Please schedule a pre-op physical with your primary care doctor within 30 days prior to the surgery date. This may be virtual or face-to-face depending on your doctors preference. Call them right away to schedule this.    2. PCR-Rated COVID19 testing is required within 4 days of surgery. We have this scheduled for you at [facility], [address] [place] [address] on 6/7/2022 at 9:15 am. Follow the specific instructions you receive by Oscar. If your test is positive, your surgery will be canceled.     3. Nothing to eat or drink for 8 hours before surgery unless instructed differently by the pre-op nurse.    4. If the community sees a new COVID19 surge, your procedure may need to be postponed. We will contact you if this happens.     5. You will need an adult to drive you home and stay with you 24 hours after surgery. Public transportation or Medical Van Services are not permitted.    6. You may have one family member wait in the lobby at the surgery center during your surgery. Visitor restrictions are subject to change, please verify with the pre-op nurse when they call.    7. You will be screened for high-risk exposure to Covid-19 during the pre-op call.  We encourage you to quarantine yourself away from any Covid-19 people for 10 days before surgery to avoid possible last minute cancellations.   When you arrive to the surgery center, you will again be screened for COVID19 symptoms. If you screen positive, your surgery will need to be postponed.    8. We always encourage you to notify your insurance any time you have medical tests or procedures scheduled including surgery. The number is usually right on the back of your  insurance card. Please call [facility] Cost of Care at [phone] for the surgeon fees, and [facility] Cost of Care [phone] for facility fees if you need pricing information.     9. You will receive a call from a pre-op call nurse 1-3 days prior to surgery. She will go over more details with you.     Call our office if you have any questions! Thank you!

## 2022-05-12 NOTE — TELEPHONE ENCOUNTER
Spoke with Camden white regarding surgery scheduling     Patient was informed of the followin. Patient has been informed to consult their PCP/Cardiologist about stopping their blood thinners about a week before surgery.  2. Pre Op Physical will need to be done by PCP or Surgeon see below  3. Required Covid Test with in 4 days prior to surgery. (See Date Below)  4. Ride required after surgery, can not use [employer] or public transportation.    Patient was informed that failure to do so may result in cancellation of surgery    We've received instruction to get you scheduled for surgery with Dr Hay. We have that arranged as follows:     Surgery Date: 2022  Location: [facility]   Arrival Time: 11:00 am (Unless instructed differently by the pre-op call nurse)     Prep Instructions:     1. Please schedule a pre-op physical with your primary care doctor within 30 days prior to the surgery date. This may be virtual or face-to-face depending on your doctors preference. Call them right away to schedule this.    2. PCR-Rated COVID19 testing is required within 4 days of surgery. We have this scheduled for you at [facility], [address] [place] [address] on 2022 at 9:15 am. Follow the specific instructions you receive by Oscar. If your test is positive, your surgery will be canceled.     3. Nothing to eat or drink for 8 hours before surgery unless instructed differently by the pre-op nurse.    4. If the community sees a new COVID19 surge, your procedure may need to be postponed. We will contact you if this happens.     5. You will need an adult to drive you home and stay with you 24 hours after surgery. Public transportation or Medical Van Services are not permitted.    6. You may have one family member wait in the lobby at the surgery center during your surgery. Visitor restrictions are subject to change, please verify with the pre-op nurse when they call.    7. You will be screened  for high-risk exposure to Covid-19 during the pre-op call.  We encourage you to quarantine yourself away from any Covid-19 people for 10 days before surgery to avoid possible last minute cancellations.   When you arrive to the surgery center, you will again be screened for COVID19 symptoms. If you screen positive, your surgery will need to be postponed.    8. We always encourage you to notify your insurance any time you have medical tests or procedures scheduled including surgery. The number is usually right on the back of your insurance card. Please call [facility] Cost of Care at [phone] for the surgeon fees, and [facility] Cost of Care [phone] for facility fees if you need pricing information.     9. You will receive a call from a pre-op call nurse 1-3 days prior to surgery. She will go over more details with you.     Call our office if you have any questions! Thank you!           Surgery Letter sent via [employer]

## 2022-05-13 PROBLEM — K40.20 NON-RECURRENT BILATERAL INGUINAL HERNIA WITHOUT OBSTRUCTION OR GANGRENE: Status: ACTIVE | Noted: 2022-05-13

## 2022-05-31 ENCOUNTER — LAB REQUISITION (OUTPATIENT)
Dept: LAB | Facility: CLINIC | Age: 62
End: 2022-05-31

## 2022-05-31 DIAGNOSIS — Z11.59 ENCOUNTER FOR SCREENING FOR OTHER VIRAL DISEASES: Primary | ICD-10-CM

## 2022-05-31 DIAGNOSIS — Z01.818 ENCOUNTER FOR OTHER PREPROCEDURAL EXAMINATION: ICD-10-CM

## 2022-05-31 LAB
ERYTHROCYTE [DISTWIDTH] IN BLOOD BY AUTOMATED COUNT: 12.2 % (ref 10–15)
HCT VFR BLD AUTO: 43.4 % (ref 40–53)
HGB BLD-MCNC: 14.3 G/DL (ref 13.3–17.7)
MCH RBC QN AUTO: 31.1 PG (ref 26.5–33)
MCHC RBC AUTO-ENTMCNC: 32.9 G/DL (ref 31.5–36.5)
MCV RBC AUTO: 94 FL (ref 78–100)
PLATELET # BLD AUTO: 242 10E3/UL (ref 150–450)
RBC # BLD AUTO: 4.6 10E6/UL (ref 4.4–5.9)
WBC # BLD AUTO: 4.2 10E3/UL (ref 4–11)

## 2022-05-31 PROCEDURE — 85027 COMPLETE CBC AUTOMATED: CPT | Performed by: FAMILY MEDICINE

## 2022-06-07 ENCOUNTER — LAB (OUTPATIENT)
Dept: FAMILY MEDICINE | Facility: CLINIC | Age: 62
End: 2022-06-07
Payer: COMMERCIAL

## 2022-06-07 DIAGNOSIS — Z11.59 ENCOUNTER FOR SCREENING FOR OTHER VIRAL DISEASES: ICD-10-CM

## 2022-06-07 LAB — SARS-COV-2 RNA RESP QL NAA+PROBE: NEGATIVE

## 2022-06-07 PROCEDURE — U0003 INFECTIOUS AGENT DETECTION BY NUCLEIC ACID (DNA OR RNA); SEVERE ACUTE RESPIRATORY SYNDROME CORONAVIRUS 2 (SARS-COV-2) (CORONAVIRUS DISEASE [COVID-19]), AMPLIFIED PROBE TECHNIQUE, MAKING USE OF HIGH THROUGHPUT TECHNOLOGIES AS DESCRIBED BY CMS-2020-01-R: HCPCS

## 2022-06-07 PROCEDURE — U0005 INFEC AGEN DETEC AMPLI PROBE: HCPCS

## 2022-06-08 ENCOUNTER — ANESTHESIA EVENT (OUTPATIENT)
Dept: SURGERY | Facility: AMBULATORY SURGERY CENTER | Age: 62
End: 2022-06-08
Payer: COMMERCIAL

## 2022-06-09 ENCOUNTER — HOSPITAL ENCOUNTER (OUTPATIENT)
Facility: AMBULATORY SURGERY CENTER | Age: 62
Discharge: HOME | End: 2022-06-09
Attending: SURGERY
Payer: COMMERCIAL

## 2022-06-09 ENCOUNTER — ANESTHESIA (OUTPATIENT)
Dept: SURGERY | Facility: AMBULATORY SURGERY CENTER | Age: 62
End: 2022-06-09
Payer: COMMERCIAL

## 2022-06-09 VITALS
HEART RATE: 100 BPM | SYSTOLIC BLOOD PRESSURE: 135 MMHG | BODY MASS INDEX: 20.92 KG/M2 | DIASTOLIC BLOOD PRESSURE: 74 MMHG | RESPIRATION RATE: 16 BRPM | HEIGHT: 74 IN | OXYGEN SATURATION: 95 % | TEMPERATURE: 97.6 F | WEIGHT: 163 LBS

## 2022-06-09 DIAGNOSIS — K40.20 NON-RECURRENT BILATERAL INGUINAL HERNIA WITHOUT OBSTRUCTION OR GANGRENE: ICD-10-CM

## 2022-06-09 PROCEDURE — 49650 LAP ING HERNIA REPAIR INIT: CPT | Mod: 50 | Performed by: SURGERY

## 2022-06-09 DEVICE — LAP PROGRIP 15X10CM LPG1510X2: Type: IMPLANTABLE DEVICE | Site: GROIN | Status: FUNCTIONAL

## 2022-06-09 RX ORDER — ONDANSETRON 2 MG/ML
4 INJECTION INTRAMUSCULAR; INTRAVENOUS EVERY 30 MIN PRN
Status: DISCONTINUED | OUTPATIENT
Start: 2022-06-09 | End: 2022-06-10 | Stop reason: HOSPADM

## 2022-06-09 RX ORDER — OXYCODONE HYDROCHLORIDE 5 MG/1
5 TABLET ORAL EVERY 4 HOURS PRN
Status: DISCONTINUED | OUTPATIENT
Start: 2022-06-09 | End: 2022-06-10 | Stop reason: HOSPADM

## 2022-06-09 RX ORDER — ONDANSETRON 4 MG/1
4 TABLET, ORALLY DISINTEGRATING ORAL EVERY 30 MIN PRN
Status: DISCONTINUED | OUTPATIENT
Start: 2022-06-09 | End: 2022-06-10 | Stop reason: HOSPADM

## 2022-06-09 RX ORDER — SODIUM CHLORIDE, SODIUM LACTATE, POTASSIUM CHLORIDE, CALCIUM CHLORIDE 600; 310; 30; 20 MG/100ML; MG/100ML; MG/100ML; MG/100ML
INJECTION, SOLUTION INTRAVENOUS CONTINUOUS
Status: DISCONTINUED | OUTPATIENT
Start: 2022-06-09 | End: 2022-06-10 | Stop reason: HOSPADM

## 2022-06-09 RX ORDER — HYDROCODONE BITARTRATE AND ACETAMINOPHEN 5; 325 MG/1; MG/1
1-2 TABLET ORAL EVERY 4 HOURS PRN
Qty: 15 TABLET | Refills: 0 | Status: SHIPPED | OUTPATIENT
Start: 2022-06-09

## 2022-06-09 RX ORDER — CLINDAMYCIN PHOSPHATE 900 MG/50ML
900 INJECTION, SOLUTION INTRAVENOUS
Status: DISCONTINUED | OUTPATIENT
Start: 2022-06-09 | End: 2022-06-10 | Stop reason: HOSPADM

## 2022-06-09 RX ORDER — KETOROLAC TROMETHAMINE 30 MG/ML
INJECTION, SOLUTION INTRAMUSCULAR; INTRAVENOUS PRN
Status: DISCONTINUED | OUTPATIENT
Start: 2022-06-09 | End: 2022-06-09

## 2022-06-09 RX ORDER — MEPERIDINE HYDROCHLORIDE 25 MG/ML
12.5 INJECTION INTRAMUSCULAR; INTRAVENOUS; SUBCUTANEOUS
Status: DISCONTINUED | OUTPATIENT
Start: 2022-06-09 | End: 2022-06-10 | Stop reason: HOSPADM

## 2022-06-09 RX ORDER — GLYCOPYRROLATE 0.2 MG/ML
INJECTION, SOLUTION INTRAMUSCULAR; INTRAVENOUS PRN
Status: DISCONTINUED | OUTPATIENT
Start: 2022-06-09 | End: 2022-06-09

## 2022-06-09 RX ORDER — DEXAMETHASONE SODIUM PHOSPHATE 4 MG/ML
INJECTION, SOLUTION INTRA-ARTICULAR; INTRALESIONAL; INTRAMUSCULAR; INTRAVENOUS; SOFT TISSUE PRN
Status: DISCONTINUED | OUTPATIENT
Start: 2022-06-09 | End: 2022-06-09

## 2022-06-09 RX ORDER — FENTANYL CITRATE 50 UG/ML
INJECTION, SOLUTION INTRAMUSCULAR; INTRAVENOUS PRN
Status: DISCONTINUED | OUTPATIENT
Start: 2022-06-09 | End: 2022-06-09

## 2022-06-09 RX ORDER — NEOSTIGMINE METHYLSULFATE 1 MG/ML
VIAL (ML) INJECTION PRN
Status: DISCONTINUED | OUTPATIENT
Start: 2022-06-09 | End: 2022-06-09

## 2022-06-09 RX ORDER — EPHEDRINE SULFATE 50 MG/ML
INJECTION, SOLUTION INTRAMUSCULAR; INTRAVENOUS; SUBCUTANEOUS PRN
Status: DISCONTINUED | OUTPATIENT
Start: 2022-06-09 | End: 2022-06-09

## 2022-06-09 RX ORDER — FENTANYL CITRATE 0.05 MG/ML
25 INJECTION, SOLUTION INTRAMUSCULAR; INTRAVENOUS EVERY 5 MIN PRN
Status: DISCONTINUED | OUTPATIENT
Start: 2022-06-09 | End: 2022-06-10 | Stop reason: HOSPADM

## 2022-06-09 RX ORDER — ONDANSETRON 2 MG/ML
INJECTION INTRAMUSCULAR; INTRAVENOUS PRN
Status: DISCONTINUED | OUTPATIENT
Start: 2022-06-09 | End: 2022-06-09

## 2022-06-09 RX ORDER — BUPIVACAINE HYDROCHLORIDE AND EPINEPHRINE 2.5; 5 MG/ML; UG/ML
INJECTION, SOLUTION INFILTRATION; PERINEURAL PRN
Status: DISCONTINUED | OUTPATIENT
Start: 2022-06-09 | End: 2022-06-09 | Stop reason: HOSPADM

## 2022-06-09 RX ORDER — ACETAMINOPHEN 325 MG/1
975 TABLET ORAL ONCE
Status: COMPLETED | OUTPATIENT
Start: 2022-06-09 | End: 2022-06-09

## 2022-06-09 RX ORDER — HYDROMORPHONE HCL IN WATER/PF 6 MG/30 ML
0.2 PATIENT CONTROLLED ANALGESIA SYRINGE INTRAVENOUS EVERY 5 MIN PRN
Status: DISCONTINUED | OUTPATIENT
Start: 2022-06-09 | End: 2022-06-10 | Stop reason: HOSPADM

## 2022-06-09 RX ORDER — FENTANYL CITRATE 0.05 MG/ML
25 INJECTION, SOLUTION INTRAMUSCULAR; INTRAVENOUS
Status: DISCONTINUED | OUTPATIENT
Start: 2022-06-09 | End: 2022-06-10 | Stop reason: HOSPADM

## 2022-06-09 RX ORDER — PROPOFOL 10 MG/ML
INJECTION, EMULSION INTRAVENOUS PRN
Status: DISCONTINUED | OUTPATIENT
Start: 2022-06-09 | End: 2022-06-09

## 2022-06-09 RX ORDER — LIDOCAINE HYDROCHLORIDE 10 MG/ML
INJECTION, SOLUTION INFILTRATION; PERINEURAL PRN
Status: DISCONTINUED | OUTPATIENT
Start: 2022-06-09 | End: 2022-06-09

## 2022-06-09 RX ORDER — MAGNESIUM SULFATE HEPTAHYDRATE 40 MG/ML
4 INJECTION, SOLUTION INTRAVENOUS ONCE
Status: COMPLETED | OUTPATIENT
Start: 2022-06-09 | End: 2022-06-09

## 2022-06-09 RX ORDER — LIDOCAINE 40 MG/G
CREAM TOPICAL
Status: DISCONTINUED | OUTPATIENT
Start: 2022-06-09 | End: 2022-06-10 | Stop reason: HOSPADM

## 2022-06-09 RX ORDER — CLINDAMYCIN PHOSPHATE 900 MG/50ML
900 INJECTION, SOLUTION INTRAVENOUS SEE ADMIN INSTRUCTIONS
Status: DISCONTINUED | OUTPATIENT
Start: 2022-06-09 | End: 2022-06-10 | Stop reason: HOSPADM

## 2022-06-09 RX ORDER — ACETAMINOPHEN 325 MG/1
650 TABLET ORAL EVERY 4 HOURS PRN
Qty: 50 TABLET | Refills: 0 | Status: SHIPPED | OUTPATIENT
Start: 2022-06-09

## 2022-06-09 RX ADMIN — FENTANYL CITRATE 50 MCG: 50 INJECTION, SOLUTION INTRAMUSCULAR; INTRAVENOUS at 11:28

## 2022-06-09 RX ADMIN — ONDANSETRON 4 MG: 2 INJECTION INTRAMUSCULAR; INTRAVENOUS at 12:27

## 2022-06-09 RX ADMIN — FENTANYL CITRATE 50 MCG: 50 INJECTION, SOLUTION INTRAMUSCULAR; INTRAVENOUS at 11:26

## 2022-06-09 RX ADMIN — KETOROLAC TROMETHAMINE 30 MG: 30 INJECTION, SOLUTION INTRAMUSCULAR; INTRAVENOUS at 12:05

## 2022-06-09 RX ADMIN — EPHEDRINE SULFATE 5 MG: 50 INJECTION, SOLUTION INTRAMUSCULAR; INTRAVENOUS; SUBCUTANEOUS at 12:22

## 2022-06-09 RX ADMIN — EPHEDRINE SULFATE 5 MG: 50 INJECTION, SOLUTION INTRAMUSCULAR; INTRAVENOUS; SUBCUTANEOUS at 11:41

## 2022-06-09 RX ADMIN — PROPOFOL 150 MG: 10 INJECTION, EMULSION INTRAVENOUS at 11:29

## 2022-06-09 RX ADMIN — Medication 50 MG: at 11:29

## 2022-06-09 RX ADMIN — Medication 4 MG: at 12:27

## 2022-06-09 RX ADMIN — MAGNESIUM SULFATE HEPTAHYDRATE 4 G: 40 INJECTION, SOLUTION INTRAVENOUS at 11:55

## 2022-06-09 RX ADMIN — PROPOFOL 50 MG: 10 INJECTION, EMULSION INTRAVENOUS at 12:26

## 2022-06-09 RX ADMIN — LIDOCAINE HYDROCHLORIDE 30 MG: 10 INJECTION, SOLUTION INFILTRATION; PERINEURAL at 11:29

## 2022-06-09 RX ADMIN — CLINDAMYCIN PHOSPHATE 900 MG: 900 INJECTION, SOLUTION INTRAVENOUS at 11:12

## 2022-06-09 RX ADMIN — SODIUM CHLORIDE, SODIUM LACTATE, POTASSIUM CHLORIDE, CALCIUM CHLORIDE: 600; 310; 30; 20 INJECTION, SOLUTION INTRAVENOUS at 11:13

## 2022-06-09 RX ADMIN — GLYCOPYRROLATE 0.7 MG: 0.2 INJECTION, SOLUTION INTRAMUSCULAR; INTRAVENOUS at 12:27

## 2022-06-09 RX ADMIN — ACETAMINOPHEN 975 MG: 325 TABLET ORAL at 11:08

## 2022-06-09 RX ADMIN — SODIUM CHLORIDE, SODIUM LACTATE, POTASSIUM CHLORIDE, CALCIUM CHLORIDE: 600; 310; 30; 20 INJECTION, SOLUTION INTRAVENOUS at 12:29

## 2022-06-09 RX ADMIN — DEXAMETHASONE SODIUM PHOSPHATE 10 MG: 4 INJECTION, SOLUTION INTRA-ARTICULAR; INTRALESIONAL; INTRAMUSCULAR; INTRAVENOUS; SOFT TISSUE at 11:29

## 2022-06-09 NOTE — DISCHARGE INSTRUCTIONS
You received 975 mg of acetaminophen (Tylenol) at 11:08 AM. Please do not take an additional dose of Tylenol until after 5:08 PM.    Do not exceed 4,000 mg of acetaminophen in a 24 hour period, keep in mind that acetaminophen can also be found in many over-the-counter cold medications as well as narcotics that may be given for pain.    You received a medication called Toradol (a stronger IV ibuprofen) at 12:05 PM. Do NOT take any Ibuprofen / Advil / Motrin / Aleve / Naproxen or products containing Ibuprofen until 6:05 PM or later.    Hernia Repair (Adult)    If you have any questions or concerns regarding your procedure, please contact Dr. Hay, his office number is [phone].    A hernia can happen when there is a weakness or defect in the wall of the abdomen or groin. Intestines or nearby tissues may move from their usual location and push through the weakness in the wall. This can cause a hernia (bulge) you may see or feel.    Causes and risk factors     A hernia may be present at birth. Or it may be caused by the wear and tear of daily living. Certain factors can make a hernia more likely. These can include:  Heavy lifting  Straining, whether from lifting, movement, or constipation  Chronic cough  Injury to the abdominal wall  Excess weight  Pregnancy  Prior surgery  Older age  Family history of hernia    Symptoms    Symptoms of a hernia may come on suddenly. Or they may appear slowly over time. Some common symptoms include:  Bulge in the groin area, around the navel, or in the scrotum (the bulge may get bigger when you stand and go away when you lie down)  Pain or pressure around the bulge  Pain during activities such as lifting, coughing, or sneezing  A feeling of weakness or pressure in the groin  Pain or swelling in the scrotum    Types of hernias    There are different types of hernia. The type you have depends on its location:  Inguinal. This type is in the groin or scrotum.  Femoral. This type is  in the groin, upper thigh (where the leg bends), or labia.  Ventral. This type is in the abdominal wall.  Umbilical. This type occurs around the navel (belly button).  Incisional. This type occurs at the site of a previous surgery.  The condition of the hernia can help determine how urgently it needs to be treated.  Reducible. It goes back in by itself, or it can be pushed back in.  Irreducible. It can t be pushed back in.  Incarcerated/strangulated. The intestine is trapped (incarcerated). If this happens, you won t be able to push the bulge back in. If the incarcerated hernia isn t treated, it may become strangulated. This means the area loses blood supply and the tissue may die. This requires emergency surgery. You need treatment right away.  In most cases, a hernia will not heal on its own.You may need surgery to repair the defect in the abdominal wall or groin. You ll be told more about surgery, if needed.  If your symptoms are not severe, treatment may sometimes be delayed. In such cases, you will need regular follow-up visits with the provider. You ll be asked to keep track of your symptoms and to watch for signs of more serious problems. You may also be given guidelines similar to the home care instructions below.    Home care    To help keep a hernia from getting worse, you may be advised to:  Avoid heavy lifting and straining as directed.  Take steps to prevent constipation, such as eating more fiber and drinking more water. This may help reduce straining that can occur when having a bowel movement. Reducing straining may help keep your symptoms from getting worse.  Maintain a healthy weight or lose excess weight. This can help reduce strain on abdominal muscles and tissues.  Stop smoking. This can help prevent coughing that may also strain abdominal muscles and tissues.    Follow-up care    Follow up with your healthcare provider, or as directed. If imaging tests were done, they will be reviewed a doctor.  You will be told the results and any new findings that may affect your care.    When to seek medical advice    Call your healthcare provider right away if any of these occur:  Severe pain, redness, or tenderness in the area near the hernia  Pain worsens quickly and doesn t get better  Inability to have a bowel movement or pass gas  Fever of 101.5 F (38.6 C) or higher  Hernia hardens, swells, or grows larger  Hernia can no longer be pushed back in  Pain moves to the lower right abdomen (just below the waistline), or spreads to the back    Coping with pain    *Pain after surgery is normal and expected*    If you have pain after surgery, pain medicine will help you feel better. Take it as told, before pain becomes severe. Also, ask your healthcare provider or pharmacist about other ways to control pain. This might be with heat, ice, or relaxation. And follow any other instructions your surgeon or nurse gives you.    Tips for taking pain medicine    To get the best relief possible, remember these points:    Pain medicines can upset your stomach. Taking them with a little food may help.  Most pain relievers taken by mouth need at least 20 to 30 minutes to start to work.  Don't wait till your pain becomes severe before you take your medicine. Try to time your medicine so that you can take it before starting an activity. This might be before you get dressed, go for a walk, or sit down for dinner.  Constipation is a common side effect of pain medicines. You can take medicines such as laxatives (Miralax) or stool softeners to help ease constipation. Drinking lots of fluids and eating foods such as fruits and vegetables that are high in fiber can also help.  Drinking alcohol and taking pain medicine can cause dizziness and slow your breathing. It can even be deadly. Don't drink alcohol while taking pain medicine.  Pain medicine can make you react more slowly to things. Don't drive or run machinery while taking pain  medicine.  Your healthcare provider may tell you to take acetaminophen to help ease your pain. Ask him or her how much you are supposed to take each day. Acetaminophen or other pain relievers may interact with your prescription medicines or other over-the-counter (OTC) medicines. Some prescription medicines have acetaminophen and other ingredients. Using both prescription and OTC acetaminophen for pain can cause you to overdose. Read the labels on your OTC medicines with care. This will help you to clearly know the list of ingredients, how much to take, and any warnings. It may also help you not take too much acetaminophen. If you have questions or don't understand the information, ask your pharmacist or healthcare provider to explain it to you before you take the OTC medicine.    Discharge Instructions: After Your Surgery, regarding Anesthesia    You ve just had surgery. During surgery, you were given medicine called anesthesia to keep you relaxed and free of pain. After surgery, you may have some pain or nausea. This is common. Here are some tips for feeling better and getting well after surgery.    Going home    Your healthcare provider will show you how to take care of yourself when you go home. He or she will also answer your questions. Have an adult family member or friend drive you home. For the first 24 hours after your surgery:    Don't drive or use heavy equipment.  Don't make important decisions or sign legal papers.  Don't drink alcohol.  Have someone stay with you for the next 24 hours. He or she can watch for problems and help keep you safe.  Be sure to go to all follow-up visits with your healthcare provider. And rest after your surgery for as long as your healthcare provider tells you to.    Managing nausea    Some people have an upset stomach after surgery. This is often because of anesthesia, pain, or pain medicine, or the stress of surgery. These tips will help you handle nausea and eat healthy  foods as you get better. If you were on a special food plan before surgery, ask your healthcare provider if you should follow it while you get better. These tips may help:    Don't push yourself to eat. Your body will tell you when to eat and how much.  Start off with clear liquids and soup. They are easier to digest.  Next try semi-solid foods, such as mashed potatoes, applesauce, and gelatin, as you feel ready.  Slowly move to solid foods. Don t eat fatty, rich, or spicy foods at first.  Don't force yourself to have 3 large meals a day. Instead eat smaller amounts more often.  Take pain medicines with a small amount of solid food, such as crackers or toast, to prevent nausea.    If you have obstructive sleep apnea    You were given anesthesia medicine during surgery to keep you comfortable and free of pain. After surgery, you may have more apnea spells because of this medicine and other medicines you were given. The spells may last longer than usual.   At home:    Keep using the continuous positive airway pressure (CPAP) device when you sleep. Unless your healthcare provider tells you not to, use it when you sleep, day or night. CPAP is a common device used to treat obstructive sleep apnea.  Talk with your provider before taking any pain medicine, muscle relaxants, or sedatives. Your provider will tell you about the possible dangers of taking these medicines.

## 2022-06-09 NOTE — H&P
HISTORY AND PHYSICAL UPDATE:    I have assessed the patient and evaluated the chart and and verify the patient's clinical status has not changed since our last documentation.  The patient is ready to move forward with the planned surgery.  Lungs: Clear to auscultation  Heart: Regular rate and rhythm  HPI:  This is a 61 year old male here today with concerns of pain and bulging in his Bilateral groin.  The Left side is larger.  He has noted this for the past  20 years.  The discomfort he is experiencing is a deep gnawing pain that is worse toward the end of the day.     Allergies:Penicillin v, Cipro [ciprofloxacin], Ciprofloxacin, and Penicillins          Past Medical History:   Diagnosis Date     NO ACTIVE PROBLEMS                 Past Surgical History:   Procedure Laterality Date     COLONOSCOPY N/A 1/31/2020     Procedure: COLONOSCOPY;  Surgeon: Ronal North MD;  Location: [facility]     NO HISTORY OF SURGERY         SURGICAL HISTORY OF -          left clavicle surgery with a plate in his 50s     SURGICAL HISTORY OF -          right ankle scope     SURGICAL HISTORY OF -          appendix removed in the early 1980s     [facility] APPENDECTOMY         Description: Appendectomy;  Recorded: 06/10/2014;         CURRENT MEDS:         Current Outpatient Medications   Medication Sig Dispense Refill     NO ACTIVE MEDICATIONS .         omega 3 1000 MG CAPS           Vitamin D, Cholecalciferol, 25 MCG (1000 UT) TABS                     Family History   Problem Relation Age of Onset     Hypertension Mother       Breast Cancer Mother       Dementia Mother       Prostate Cancer Father           age 80     Parkinsonism Father       C.A.D. No family hx of       Diabetes No family hx of       Cerebrovascular Disease No family hx of       Cancer - colorectal No family hx of            reports that he has never smoked. He has never used smokeless tobacco. He reports current alcohol use. He reports that he does not use drugs.     Review of  "Systems -   10 point Review of systems is negative except for; as mentioned above in HPI and PMHx     /78   Ht 1.854 m (6' 1\")   Wt 75.1 kg (165 lb 8 oz)   BMI 21.84 kg/m    Body mass index is 21.84 kg/m .     EXAM:  GENERAL: Well developed male, thin  HEENT: EOMI, Anicteric Sclera  NECK:  No masses, good flexion and extention of the neck  CARDIAC: RRR w/out murmur   CHEST/LUNG: Clear  ABDOMEN: Bilateral inguinal hernias L > R.   GENITAL: Both testicles descended without masses  NEURO: He is ambulatory with good strength in both legs.     IMAGES: None     Assessment/Plan: Pt with a Bilateral inguinal hernia. I discussed this at length with him.  I went over conservative management as well as surgical treatment for hernias.   I would reccomend a laparoscopic inguinal hernia repair, understanding the possibility of converting to an open operation.   I went over the small risks of surgery including but not limited to bleeding and infection. I discussed the expected recovery time as well. We will schedule this hernia repair at his earliest convenience.     Gigi Hay MD  [facility]  [phone]      Gigi Hay  [facility]; surgeons  [phone]   "

## 2022-06-09 NOTE — ANESTHESIA PREPROCEDURE EVALUATION
Anesthesia Pre-Procedure Evaluation    Patient: Ronal Talley   MRN: [id number] : 1960        Procedure : Procedure(s):  HERNIORRHAPHY, INGUINAL, LAPAROSCOPIC          Past Medical History:   Diagnosis Date     Heart murmur      NO ACTIVE PROBLEMS       Past Surgical History:   Procedure Laterality Date     COLONOSCOPY N/A 2020    Procedure: COLONOSCOPY;  Surgeon: Ronal North MD;  Location: [facility]     NO HISTORY OF SURGERY       SURGICAL HISTORY OF -       left clavicle surgery with a plate in his 50s     SURGICAL HISTORY OF -       right ankle scope     SURGICAL HISTORY OF -       appendix removed in the early      [facility] APPENDECTOMY      Description: Appendectomy;  Recorded: 06/10/2014;      Allergies   Allergen Reactions     Penicillin V Anaphylaxis     Cipro [Ciprofloxacin] Rash     Ciprofloxacin      Other reaction(s): red rash, vancomycin infusion reaction (cutaneous flushing/non-allergic reaction)     Penicillins Anaphylaxis     Shingrix [Zoster Vac Recomb Adjuvanted] Rash      Social History     Tobacco Use     Smoking status: Never Smoker     Smokeless tobacco: Never Used   Substance Use Topics     Alcohol use: Yes     Comment: 2-3 wine per week      Wt Readings from Last 1 Encounters:   22 73.9 kg (163 lb)        Anesthesia Evaluation            ROS/MED HX  ENT/Pulmonary:  - neg pulmonary ROS     Neurologic:  - neg neurologic ROS     Cardiovascular:  - neg cardiovascular ROS     METS/Exercise Tolerance:     Hematologic:  - neg hematologic  ROS     Musculoskeletal: Comment: Possible gout      GI/Hepatic:  - neg GI/hepatic ROS     Renal/Genitourinary:  - neg Renal ROS     Endo:    Thyroid problem: thyroid nodule.   Psychiatric/Substance Use:  - neg psychiatric ROS     Infectious Disease:  - neg infectious disease ROS     Malignancy:  - neg malignancy ROS     Other:  - neg other ROS          Physical Exam    Airway  airway exam normal      Mallampati: II       Respiratory Devices and  Support         Dental  no notable dental history         Cardiovascular   cardiovascular exam normal       Rhythm and rate: regular and normal     Pulmonary   pulmonary exam normal        breath sounds clear to auscultation           OUTSIDE LABS:  CBC:   Lab Results   Component Value Date    WBC 4.2 05/31/2022    WBC 5.2 09/08/2006    HGB 14.3 05/31/2022    HGB 15.4 09/13/2010    HCT 43.4 05/31/2022    HCT 46.7 09/08/2006     05/31/2022     09/08/2006     BMP:   Lab Results   Component Value Date    GLC 88 03/22/2022     (H) 11/12/2019     COAGS: No results found for: PTT, INR, FIBR  POC: No results found for: BGM, HCG, HCGS  HEPATIC: No results found for: ALBUMIN, PROTTOTAL, ALT, AST, GGT, ALKPHOS, BILITOTAL, BILIDIRECT, JESUS  OTHER:   Lab Results   Component Value Date    TSH 0.14 (L) 03/22/2022    T4 0.96 03/22/2022       Anesthesia Plan    ASA Status:  1      Anesthesia Type: General.     - Airway: ETT   Induction: Intravenous.   Maintenance: Balanced.        Consents    Anesthesia Plan(s) and associated risks, benefits, and realistic alternatives discussed. Questions answered and patient/representative(s) expressed understanding.    - Discussed:     - Discussed with:  Patient      - Extended Intubation/Ventilatory Support Discussed: No.      - Patient is DNR/DNI Status: No    Use of blood products discussed: No .     Postoperative Care    Pain management: IV analgesics, Multi-modal analgesia.   PONV prophylaxis: Ondansetron (or other 5HT-3), Dexamethasone or Solumedrol     Comments:    Other Comments: GAETT  Magnesium donis op infusion  Ketamine after induction  Antiemetics  Tylenol po pre op  Toradol at the end of case if okay with surgeon  Consider background propofol  Soft bite block            Ronal Fraga MD

## 2022-06-09 NOTE — ANESTHESIA POSTPROCEDURE EVALUATION
Patient: Ronal Adamsright    Procedure: Procedure(s):  HERNIORRHAPHY, INGUINAL, LAPAROSCOPIC       Anesthesia Type:  General    Note:  Disposition: Outpatient   Postop Pain Control: Uneventful            Sign Out: Well controlled pain   PONV: No   Neuro/Psych: Uneventful            Sign Out: Acceptable/Baseline neuro status   Airway/Respiratory: Uneventful            Sign Out: Acceptable/Baseline resp. status   CV/Hemodynamics: Uneventful            Sign Out: Acceptable CV status; No obvious hypovolemia; No obvious fluid overload   Other NRE: NONE   DID A NON-ROUTINE EVENT OCCUR? No           Last vitals:  Vitals Value Taken Time   /65 06/09/22 1300   Temp 97.9  F (36.6  C) 06/09/22 1240   Pulse 79 06/09/22 1302   Resp 16 06/09/22 1300   SpO2 98 % 06/09/22 1302   Vitals shown include unvalidated device data.    Electronically Signed By: Ronal Fraga MD  June 9, 2022  1:53 PM

## 2022-06-09 NOTE — OP NOTE
Operative Note:  Laparoscopic Bilateral Inguinal Hernia Repair    Name:  Ronal SCOTT Gerri  PCP:  Juan Shelby  Procedure Date:  6/9/2022      Pre-Procedure Diagnosis:  Non-recurrent bilateral inguinal hernia without obstruction or gangrene [K40.20]     Post-Procedure Diagnosis:      Bilateral inguinal hernia repair    Surgeon(s):  Gigi Hay MD      Anesthesia Type:  GET      Findings:  BIH left larger then Right, both are indirect    Operative Note:  The patient was brought to the operating room placed in the supine position and given general endotracheal anesthesia.  She was sterilely prepped and draped in the usual surgical fashion.    Curvilinear incision was made beneath the umbilicus the subcu tissues were dissected through bluntly with S retractors and the superficial rectus sheath fascia was scored with a scalpel and opened with Metzenbaum scissors.  The underlying rectus muscle was retracted laterally and superficially and the dilation balloon catheter was advanced superficial to the deep rectus sheath but deep to the rectus muscle.  Under direct visualization of 0  laparoscope the dilation balloon was brought up with 20 pumps of air.  The dilation balloon catheter was replaced with a pneumatic footplate trocar.  The balloon at the end of the trocar was inflated and a pneumopreperitoneum was brought up to 15 mmHg.  2  5 mm trochars were brought in under direct visualization in the lower midline.  The preperitoneal space was dissected out with blunt dissection and a hernia defects were noted just medial to the epigastric vessels, consistant with direct inguinal hernias.  The hernia sac was reduced on both the Right and the L side.  Then the tissues leading into the internal inguinal rings were dissected out.   The peritoneum on each side was reflected back from the pelvic floor and a Progrip mesh was inserted and was advanced into the preperitoneal space.  It was unfurled in the appropriate  orientation.  This was first done on the R and then then L.   40 mL of local anesthesia were instilled into that preperitoneal space and the pneumo preperitoneum was deflated.    Closure was undertaken I checked in the intra-peritoneal space while closing the umbilical trocar to release any intraperitoneal air.  I then closed the abdominal wall.  First the peritoneum and then the fascia with 0 Vicryl suture.  I closed the skin with a running 4-0 subcuticular Monocryl suture.  The 25 mm trocar sites were both closed at the level of the skin with a 40 subcu Monocryl stitch.  The wounds were dressed with Telfa and Tegaderm the patient was extubated taken recovery there is no complications with this procedure      Estimated Blood Loss:   5 cc    Specimens:    * No specimens in log *       Drains:        Complications:    None    Gigi Hay MD     Date: 6/9/2022  Time: 12:55 PM

## 2022-06-09 NOTE — ANESTHESIA PROCEDURE NOTES
Airway       Patient location during procedure: OR       Procedure Start/Stop Times: 6/9/2022 11:32 AM  Staff -        CRNA: Delphine Vaca APRN CRNA       Performed By: CRNA  Consent for Airway        Urgency: elective  Indications and Patient Condition       Indications for airway management: donis-procedural       Induction type:intravenous       Mask difficulty assessment: 1 - vent by mask    Final Airway Details       Final airway type: endotracheal airway       Successful airway: ETT - single and Oral  Endotracheal Airway Details        ETT size (mm): 7.5       Cuffed: yes       Cuff volume (mL): 10       Successful intubation technique: direct laryngoscopy       DL Blade Type: Cortes 2       Grade View of Cords: 1       Adjucts: stylet and tooth guard       Position: Center       Measured from: gums/teeth       Secured at (cm): 22       Bite block used: None    Post intubation assessment        Placement verified by: capnometry, equal breath sounds and chest rise        Number of attempts at approach: 1       Number of other approaches attempted: 0       Secured with: silk tape       Ease of procedure: easy       Dentition: Intact and Unchanged    Medication(s) Administered   Medication Administration Time: 6/9/2022 11:32 AM

## 2022-06-09 NOTE — ANESTHESIA CARE TRANSFER NOTE
Patient: Ronal SCOTT Gerri    Procedure: Procedure(s):  HERNIORRHAPHY, INGUINAL, LAPAROSCOPIC       Diagnosis: Non-recurrent bilateral inguinal hernia without obstruction or gangrene [K40.20]  Diagnosis Additional Information: No value filed.    Anesthesia Type:   General     Note:    Oropharynx: oral airway in place  Level of Consciousness: drowsy  Oxygen Supplementation: face mask  Level of Supplemental Oxygen (L/min / FiO2): 6  Independent Airway: airway patency satisfactory and stable  Dentition: dentition unchanged  Vital Signs Stable: post-procedure vital signs reviewed and stable  Report to RN Given: handoff report given  Patient transferred to: PACU    Handoff Report: Identifed the Patient, Identified the Reponsible Provider, Reviewed the pertinent medical history, Discussed the surgical course, Reviewed Intra-OP anesthesia mangement and issues during anesthesia, Set expectations for post-procedure period and Allowed opportunity for questions and acknowledgement of understanding      Vitals:  Vitals Value Taken Time   /69 06/09/22 1240   Temp 36.6  C (97.9  F) 06/09/22 1240   Pulse 100 06/09/22 1240   Resp 16 06/09/22 1240   SpO2 99 % 06/09/22 1240       Electronically Signed By: ANA Roberts CRNA  June 9, 2022  12:42 PM

## 2022-06-23 ENCOUNTER — OFFICE VISIT (OUTPATIENT)
Dept: SURGERY | Facility: CLINIC | Age: 62
End: 2022-06-23
Payer: COMMERCIAL

## 2022-06-23 VITALS — DIASTOLIC BLOOD PRESSURE: 82 MMHG | SYSTOLIC BLOOD PRESSURE: 124 MMHG

## 2022-06-23 DIAGNOSIS — K40.20 NON-RECURRENT BILATERAL INGUINAL HERNIA WITHOUT OBSTRUCTION OR GANGRENE: Primary | ICD-10-CM

## 2022-06-23 PROCEDURE — 99024 POSTOP FOLLOW-UP VISIT: CPT | Performed by: PHYSICIAN ASSISTANT

## 2022-06-23 NOTE — LETTER
6/23/2022         RE: Ronal Talley  [address] [facility] [place] [id number]        Dear Colleague,    Thank you for referring your patient, Ronal Talley, to the [facility]. Please see a copy of my visit note below.    HPI: Pt is here for follow up s/p bilateral lap IHR with Dr. Hay on 6/9.   he is doing well.  Pain is well controlled:  Yes. No difficulties with the surgical wound/wounds.  he is eating well and denies fever and chills.        /82     EXAM:  GENERAL:Appears well  ABDOMEN:  Soft, +BS  SURGICAL WOUNDS:  Incisions healing well, no induration or drainage. More swelling and firmness on left consistent with scar tissue, no evidence of recurrent hernia      Assessment/Plan: Doing well after surgery and should follow up as needed.    Wai Montenegro PA-C  [phone]  General Surgery           Again, thank you for allowing me to participate in the care of your patient.        Sincerely,        Wai Montenegro PA-C

## 2022-06-23 NOTE — PROGRESS NOTES
HPI: Pt is here for follow up s/p bilateral lap IHR with Dr. Hay on 6/9.   he is doing well.  Pain is well controlled:  Yes. No difficulties with the surgical wound/wounds.  he is eating well and denies fever and chills.        /82     EXAM:  GENERAL:Appears well  ABDOMEN:  Soft, +BS  SURGICAL WOUNDS:  Incisions healing well, no induration or drainage. More swelling and firmness on left consistent with scar tissue, no evidence of recurrent hernia      Assessment/Plan: Doing well after surgery and should follow up as needed.    Wai Montenegro PA-C  [phone]  General Surgery

## 2022-10-30 ENCOUNTER — HEALTH MAINTENANCE LETTER (OUTPATIENT)
Age: 62
End: 2022-10-30

## 2023-06-01 ENCOUNTER — HEALTH MAINTENANCE LETTER (OUTPATIENT)
Age: 63
End: 2023-06-01

## 2023-10-20 ENCOUNTER — LAB REQUISITION (OUTPATIENT)
Dept: LAB | Facility: CLINIC | Age: 63
End: 2023-10-20
Payer: COMMERCIAL

## 2023-10-20 DIAGNOSIS — Z80.42 FAMILY HISTORY OF MALIGNANT NEOPLASM OF PROSTATE: ICD-10-CM

## 2023-10-20 DIAGNOSIS — Z13.1 ENCOUNTER FOR SCREENING FOR DIABETES MELLITUS: ICD-10-CM

## 2023-10-20 DIAGNOSIS — E05.90 THYROTOXICOSIS, UNSPECIFIED WITHOUT THYROTOXIC CRISIS OR STORM: ICD-10-CM

## 2023-10-20 LAB
FASTING STATUS PATIENT QL REPORTED: YES
GLUCOSE SERPL-MCNC: 100 MG/DL (ref 70–99)
PSA SERPL DL<=0.01 NG/ML-MCNC: 0.49 NG/ML (ref 0–4.5)
TSH SERPL DL<=0.005 MIU/L-ACNC: 0.45 UIU/ML (ref 0.3–4.2)

## 2023-10-20 PROCEDURE — 84443 ASSAY THYROID STIM HORMONE: CPT | Mod: ORL | Performed by: FAMILY MEDICINE

## 2023-10-20 PROCEDURE — G0103 PSA SCREENING: HCPCS | Mod: ORL | Performed by: FAMILY MEDICINE

## 2023-10-20 PROCEDURE — 82947 ASSAY GLUCOSE BLOOD QUANT: CPT | Mod: ORL | Performed by: FAMILY MEDICINE

## 2023-11-12 ENCOUNTER — HEALTH MAINTENANCE LETTER (OUTPATIENT)
Age: 63
End: 2023-11-12

## 2024-11-26 ENCOUNTER — LAB REQUISITION (OUTPATIENT)
Dept: LAB | Facility: CLINIC | Age: 64
End: 2024-11-26

## 2024-11-26 DIAGNOSIS — E05.90 THYROTOXICOSIS, UNSPECIFIED WITHOUT THYROTOXIC CRISIS OR STORM: ICD-10-CM

## 2024-11-26 DIAGNOSIS — Z80.42 FAMILY HISTORY OF MALIGNANT NEOPLASM OF PROSTATE: ICD-10-CM

## 2024-11-26 DIAGNOSIS — Z13.1 ENCOUNTER FOR SCREENING FOR DIABETES MELLITUS: ICD-10-CM

## 2024-11-26 LAB
ANION GAP SERPL CALCULATED.3IONS-SCNC: 9 MMOL/L (ref 7–15)
BUN SERPL-MCNC: 7.6 MG/DL (ref 8–23)
CALCIUM SERPL-MCNC: 9.4 MG/DL (ref 8.8–10.4)
CHLORIDE SERPL-SCNC: 102 MMOL/L (ref 98–107)
CREAT SERPL-MCNC: 0.9 MG/DL (ref 0.67–1.17)
EGFRCR SERPLBLD CKD-EPI 2021: >90 ML/MIN/1.73M2
GLUCOSE SERPL-MCNC: 100 MG/DL (ref 70–99)
HCO3 SERPL-SCNC: 26 MMOL/L (ref 22–29)
POTASSIUM SERPL-SCNC: 4.5 MMOL/L (ref 3.4–5.3)
PSA SERPL DL<=0.01 NG/ML-MCNC: 0.56 NG/ML (ref 0–4.5)
SODIUM SERPL-SCNC: 137 MMOL/L (ref 135–145)
TSH SERPL DL<=0.005 MIU/L-ACNC: 0.52 UIU/ML (ref 0.3–4.2)

## 2024-11-26 PROCEDURE — 80048 BASIC METABOLIC PNL TOTAL CA: CPT | Performed by: FAMILY MEDICINE

## 2024-11-26 PROCEDURE — 82565 ASSAY OF CREATININE: CPT | Performed by: FAMILY MEDICINE

## 2024-11-26 PROCEDURE — G0103 PSA SCREENING: HCPCS | Performed by: FAMILY MEDICINE

## 2024-11-26 PROCEDURE — 84443 ASSAY THYROID STIM HORMONE: CPT | Performed by: FAMILY MEDICINE

## 2024-12-28 ENCOUNTER — HEALTH MAINTENANCE LETTER (OUTPATIENT)
Age: 64
End: 2024-12-28

## 2025-03-14 ENCOUNTER — LAB REQUISITION (OUTPATIENT)
Dept: LAB | Facility: CLINIC | Age: 65
End: 2025-03-14

## 2025-03-14 DIAGNOSIS — R31.9 HEMATURIA, UNSPECIFIED: ICD-10-CM

## 2025-03-14 PROCEDURE — 87086 URINE CULTURE/COLONY COUNT: CPT | Performed by: FAMILY MEDICINE

## 2025-03-15 LAB — BACTERIA UR CULT: NORMAL

## 2025-03-29 ENCOUNTER — APPOINTMENT (OUTPATIENT)
Dept: CT IMAGING | Facility: CLINIC | Age: 65
End: 2025-03-29
Attending: EMERGENCY MEDICINE
Payer: COMMERCIAL

## 2025-03-29 ENCOUNTER — HOSPITAL ENCOUNTER (EMERGENCY)
Facility: CLINIC | Age: 65
Discharge: HOME | End: 2025-03-29
Attending: EMERGENCY MEDICINE | Admitting: EMERGENCY MEDICINE
Payer: COMMERCIAL

## 2025-03-29 VITALS
TEMPERATURE: 98.5 F | SYSTOLIC BLOOD PRESSURE: 166 MMHG | RESPIRATION RATE: 16 BRPM | HEART RATE: 58 BPM | BODY MASS INDEX: 21.67 KG/M2 | DIASTOLIC BLOOD PRESSURE: 94 MMHG | OXYGEN SATURATION: 96 % | HEIGHT: 72 IN | WEIGHT: 160 LBS

## 2025-03-29 DIAGNOSIS — M54.2 NECK PAIN: Primary | ICD-10-CM

## 2025-03-29 DIAGNOSIS — M54.2 NECK PAIN ON RIGHT SIDE: ICD-10-CM

## 2025-03-29 LAB
ANION GAP SERPL CALCULATED.3IONS-SCNC: 11 MMOL/L (ref 7–15)
BUN SERPL-MCNC: 10.3 MG/DL (ref 8–23)
CALCIUM SERPL-MCNC: 9.5 MG/DL (ref 8.8–10.4)
CHLORIDE SERPL-SCNC: 98 MMOL/L (ref 98–107)
CREAT SERPL-MCNC: 0.96 MG/DL (ref 0.67–1.17)
EGFRCR SERPLBLD CKD-EPI 2021: 87 ML/MIN/1.73M2
GLUCOSE SERPL-MCNC: 92 MG/DL (ref 70–99)
HCO3 SERPL-SCNC: 27 MMOL/L (ref 22–29)
POTASSIUM SERPL-SCNC: 4.3 MMOL/L (ref 3.4–5.3)
SODIUM SERPL-SCNC: 136 MMOL/L (ref 135–145)

## 2025-03-29 PROCEDURE — 70450 CT HEAD/BRAIN W/O DYE: CPT

## 2025-03-29 PROCEDURE — 99285 EMERGENCY DEPT VISIT HI MDM: CPT | Mod: 25 | Performed by: EMERGENCY MEDICINE

## 2025-03-29 PROCEDURE — 82310 ASSAY OF CALCIUM: CPT | Performed by: EMERGENCY MEDICINE

## 2025-03-29 PROCEDURE — 250N000011 HC RX IP 250 OP 636: Performed by: EMERGENCY MEDICINE

## 2025-03-29 PROCEDURE — 80048 BASIC METABOLIC PNL TOTAL CA: CPT | Performed by: EMERGENCY MEDICINE

## 2025-03-29 PROCEDURE — 36415 COLL VENOUS BLD VENIPUNCTURE: CPT | Performed by: EMERGENCY MEDICINE

## 2025-03-29 PROCEDURE — 99284 EMERGENCY DEPT VISIT MOD MDM: CPT | Performed by: EMERGENCY MEDICINE

## 2025-03-29 PROCEDURE — 70496 CT ANGIOGRAPHY HEAD: CPT

## 2025-03-29 PROCEDURE — 250N000009 HC RX 250: Performed by: EMERGENCY MEDICINE

## 2025-03-29 RX ORDER — IOPAMIDOL 755 MG/ML
67 INJECTION, SOLUTION INTRAVASCULAR ONCE
Status: COMPLETED | OUTPATIENT
Start: 2025-03-29 | End: 2025-03-29

## 2025-03-29 RX ADMIN — IOPAMIDOL 67 ML: 755 INJECTION, SOLUTION INTRAVENOUS at 19:23

## 2025-03-29 RX ADMIN — SODIUM CHLORIDE 100 ML: 9 INJECTION, SOLUTION INTRAVENOUS at 19:23

## 2025-03-29 ASSESSMENT — ACTIVITIES OF DAILY LIVING (ADL)
ADLS_ACUITY_SCORE: 42

## 2025-03-29 ASSESSMENT — COLUMBIA-SUICIDE SEVERITY RATING SCALE - C-SSRS
1. IN THE PAST MONTH, HAVE YOU WISHED YOU WERE DEAD OR WISHED YOU COULD GO TO SLEEP AND NOT WAKE UP?: NO
2. HAVE YOU ACTUALLY HAD ANY THOUGHTS OF KILLING YOURSELF IN THE PAST MONTH?: NO
6. HAVE YOU EVER DONE ANYTHING, STARTED TO DO ANYTHING, OR PREPARED TO DO ANYTHING TO END YOUR LIFE?: NO

## 2025-03-29 ASSESSMENT — ENCOUNTER SYMPTOMS
CARDIOVASCULAR NEGATIVE: 1
PSYCHIATRIC NEGATIVE: 1
CONSTITUTIONAL NEGATIVE: 1
ALLERGIC/IMMUNOLOGIC NEGATIVE: 1
NEUROLOGICAL NEGATIVE: 1
NECK PAIN: 1
ENDOCRINE NEGATIVE: 1
RESPIRATORY NEGATIVE: 1
GASTROINTESTINAL NEGATIVE: 1
EYES NEGATIVE: 1
HEMATOLOGIC/LYMPHATIC NEGATIVE: 1

## 2025-03-29 NOTE — ED TRIAGE NOTES
"Presents with 2 days right sided neck pain directly over carotid artery. Pt states he feels a \"lump\" over area of pain.   Had cataract surgery on Wednesday     Triage Assessment (Adult)       Row Name 03/29/25 [id number]          Triage Assessment    Airway WDL WDL        Respiratory WDL    Respiratory WDL WDL        Skin Circulation/Temperature WDL    Skin Circulation/Temperature WDL WDL        Cardiac WDL    Cardiac WDL WDL        Peripheral/Neurovascular WDL    Peripheral Neurovascular WDL WDL        Cognitive/Neuro/Behavioral WDL    Cognitive/Neuro/Behavioral WDL WDL                     "

## 2025-03-29 NOTE — ED PROVIDER NOTES
History     Chief Complaint   Patient presents with    Neck Pain     HPI  Ronal Talley is a 64 year old male who who presents with neck pain.    Reviewed medical record.  Reviewed visit on 3/14/25.  Prior diagnosis of impingement syndrome right shoulder, neck pain with muscle tightness, history of bilateral hernia.  The patient's current prescribed medications were reviewed the patient at the bedside     On examination patient reports he is status post right cataract surgery by Dr.. Souza with [facility] in [place], [place]-4 days prior.  Patient reports since his procedure he was doing well.  He noticed that he developed tenderness around the right lateral neck just superior to the sternocleidomastoid and medial to his carotid.  No neck pain no visual change, no fever or chills.  No hearing changes.  History of chronic tenderness.  Does see a chiropractor for history of scoliosis but has not been adjusted for about 2 weeks and typically does not get neck manipulations.  He works as  and did ultrasound at work and was concerned about velocities obtained and wanted come in for further assessment and care  patient after discussion with the spouse.  No difficulty swallowing.  No voice change.  He also confirmed that he has been recently worked up for hematuria and had abdominal imaging with contrast the day prior at [facility] in [place], [place]. plan for follow-up with urology with nodules found in the lungs and in the bladder by patient report.  No fever      Allergies:  Allergies   Allergen Reactions    Penicillin V Anaphylaxis    Cipro [Ciprofloxacin] Rash    Ciprofloxacin      Other reaction(s): red rash, vancomycin infusion reaction (cutaneous flushing/non-allergic reaction)    Penicillins Anaphylaxis    Shingrix [Zoster Vac Recomb Adjuvanted] Rash       Problem List:    Patient Active Problem List    Diagnosis Date Noted    Non-recurrent bilateral inguinal hernia without obstruction or gangrene  05/13/2022     Priority: Medium     Added automatically from request for surgery [id number]      Wears hearing aid in both ears 11/12/2019     Priority: Medium    Arthralgia of foot, unspecified laterality 11/12/2019     Priority: Medium    Neck pain 11/12/2019     Priority: Medium    Muscle tightness 11/12/2019     Priority: Medium    CARDIOVASCULAR SCREENING; LDL GOAL LESS THAN 160 10/31/2010     Priority: Medium    Impingement syndrome, shoulder 07/11/2008     Priority: Medium     July 11, 2008 - Went over the nature of impingement syndrome and the pathophysiology of the degeneration that occurs.  Went over treatment options including exercises, PT, anti-inflammatory medications, and steroid injections.  Pt elected PT       Onychomycosis 07/11/2008     Priority: Medium    OA (osteoarthritis) 07/11/2008     Priority: Medium    Headache 09/08/2006     Priority: Medium     September 8, 2006 - Labs but likely benign.  Problem list name updated by automated process. Provider to review      FAMILY HISTORY OF PROSTATE MALIGNANCY 02/24/2005     Priority: Medium        Past Medical History:    Past Medical History:   Diagnosis Date    Heart murmur     NO ACTIVE PROBLEMS        Past Surgical History:    Past Surgical History:   Procedure Laterality Date    COLONOSCOPY N/A 1/31/2020    Procedure: COLONOSCOPY;  Surgeon: Ronal North MD;  Location: [facility]    LAPAROSCOPIC HERNIORRHAPHY INGUINAL Bilateral 6/9/2022    Procedure: HERNIORRHAPHY, INGUINAL, LAPAROSCOPIC;  Surgeon: Gigi Hay MD;  Location: [facility]    NO HISTORY OF SURGERY      SURGICAL HISTORY OF -       left clavicle surgery with a plate in his 50s    SURGICAL HISTORY OF -       right ankle scope    SURGICAL HISTORY OF -       appendix removed in the early 1980s    [facility] APPENDECTOMY      Description: Appendectomy;  Recorded: 06/10/2014;       Family History:    Family History   Problem Relation Age of Onset    Hypertension Mother     Breast  Cancer Mother     Dementia Mother     Prostate Cancer Father         age 80    Parkinsonism Father     C.A.D. No family hx of     Diabetes No family hx of     Cerebrovascular Disease No family hx of     Cancer - colorectal No family hx of        Social History:  Marital Status:   [2]  Social History     Tobacco Use    Smoking status: Never    Smokeless tobacco: Never   Substance Use Topics    Alcohol use: Yes     Comment: 2-3 wine per week    Drug use: No        Medications:    acetaminophen (TYLENOL) 325 MG tablet  HYDROcodone-acetaminophen (NORCO) 5-325 MG tablet  NO ACTIVE MEDICATIONS  omega 3 1000 MG CAPS  Vitamin D (Cholecalciferol) 25 MCG (1000 UT) TABS          Review of Systems   Constitutional: Negative.    HENT: Negative.     Eyes: Negative.    Respiratory: Negative.     Cardiovascular: Negative.    Gastrointestinal: Negative.    Endocrine: Negative.    Genitourinary: Negative.    Musculoskeletal:  Positive for neck pain (atraumatic right medial neck pain).   Skin: Negative.    Allergic/Immunologic: Negative.    Neurological: Negative.    Hematological: Negative.    Psychiatric/Behavioral: Negative.     All other systems reviewed and are negative.      Physical Exam   BP: (!) 166/94  Pulse: 58  Temp: 98.5  F (36.9  C)  Resp: 16  Height: 182.9 cm (6')  Weight: 72.6 kg (160 lb)  SpO2: 96 %      Physical Exam  Constitutional:       General: He is not in acute distress.     Appearance: Normal appearance. He is not ill-appearing, toxic-appearing or diaphoretic.   HENT:      Head: Normocephalic and atraumatic.      Right Ear: Tympanic membrane normal.      Left Ear: Tympanic membrane normal.      Nose: Nose normal.      Mouth/Throat:      Mouth: Mucous membranes are moist.   Eyes:      Extraocular Movements: Extraocular movements intact.      Pupils: Pupils are equal, round, and reactive to light.   Neck:      Thyroid: No thyroid mass.      Vascular: No carotid bruit.      Trachea: Trachea normal.      Cardiovascular:      Rate and Rhythm: Normal rate and regular rhythm.   Pulmonary:      Effort: Pulmonary effort is normal. No respiratory distress.      Breath sounds: Normal breath sounds. No stridor. No wheezing, rhonchi or rales.   Chest:      Chest wall: No tenderness.   Musculoskeletal:         General: No swelling, tenderness, deformity or signs of injury. Normal range of motion.      Cervical back: Normal range of motion and neck supple. No rigidity.      Right lower leg: No edema.      Left lower leg: No edema.   Skin:     Capillary Refill: Capillary refill takes less than 2 seconds.      Coloration: Skin is not jaundiced or pale.      Findings: No bruising, erythema, lesion or rash.   Neurological:      General: No focal deficit present.      Mental Status: He is alert and oriented to person, place, and time.      Cranial Nerves: No cranial nerve deficit.      Sensory: No sensory deficit.      Motor: No weakness.      Coordination: Coordination normal.      Gait: Gait normal.      Deep Tendon Reflexes: Reflexes normal.   Psychiatric:         Mood and Affect: Mood normal.         Behavior: Behavior normal.         Thought Content: Thought content normal.         Judgment: Judgment normal.         ED Course        Procedures              Critical Care time:  none     None       ED medications:  Medications   sodium chloride 0.9% BOLUS 500 mL (has no administration in time range)   iopamidol (ISOVUE-370) solution 67 mL (67 mLs Intravenous $Given 3/29/25 1923)   sodium chloride 0.9 % bag for CT scan flush (100 mLs Intravenous $Given 3/29/25 1923)     ED Vitals;  Vitals:    03/29/25 1736   BP: (!) 166/94   Pulse: 58   Resp: 16   Temp: 98.5  F (36.9  C)   TempSrc: Oral   SpO2: 96%   Weight: 72.6 kg (160 lb)   Height: 1.829 m (6')      ED labs and imaging:  Results for orders placed or performed during the hospital encounter of 03/29/25   CTA Head Neck with Contrast     Status: None    Narrative    EXAM: CT  HEAD W/O CONTRAST, CTA HEAD NECK W CONTRAST  LOCATION: [facility]  DATE: 3/29/2025    INDICATION: 2d hx of acute atraumatic right neck pain. Concern for carotidynia, post right cataract surgery  4d earlier. Evaluate for acute vascular process  COMPARISON: None.  CONTRAST: 67 Isovue 370  TECHNIQUE: Head and neck CT angiogram with IV contrast. Noncontrast head CT followed by axial helical CT images of the head and neck vessels obtained during the arterial phase of intravenous contrast administration. Axial 2D reconstructed images and   multiplanar 3D MIP reconstructed images of the head and neck vessels were performed by the technologist. Dose reduction techniques were used. All stenosis measurements made according to NASCET criteria unless otherwise specified.    FINDINGS:   NONCONTRAST HEAD CT:   INTRACRANIAL CONTENTS: No intracranial hemorrhage, extraaxial collection, or mass effect.  No CT evidence of acute infarct. Normal parenchymal attenuation. Normal ventricles and sulci.     VISUALIZED ORBITS/SINUSES/MASTOIDS: Prior right cataract surgery. Visualized portions of the orbits are otherwise unremarkable. No paranasal sinus mucosal disease. No middle ear or mastoid effusion.    BONES/SOFT TISSUES: No acute abnormality.    HEAD CTA:  ANTERIOR CIRCULATION: No stenosis/occlusion, aneurysm, or high flow vascular malformation. Standard [place] anatomy.    POSTERIOR CIRCULATION: No stenosis/occlusion, aneurysm, or high flow vascular malformation. Balanced vertebral arteries supply a normal basilar artery.     DURAL VENOUS SINUSES: Expected enhancement of the major dural venous sinuses.    NECK CTA:  RIGHT CAROTID: No measurable stenosis or dissection.    LEFT CAROTID: No measurable stenosis or dissection.    VERTEBRAL ARTERIES: No focal stenosis or dissection. Balanced vertebral arteries.    AORTIC ARCH: Classic aortic arch anatomy with no significant stenosis at the origin of the  great vessels.    NONVASCULAR STRUCTURES: Unremarkable.      Impression    IMPRESSION:   HEAD CT:  1.  No acute intracranial process.    HEAD CTA:  1.  No significant stenosis, aneurysm, or high flow vascular malformation identified.    NECK CTA:  1.  No hemodynamically significant stenosis within the vessels of the neck.     CT Head w/o Contrast     Status: None    Narrative    EXAM: CT HEAD W/O CONTRAST, CTA HEAD NECK W CONTRAST  LOCATION: [facility]  DATE: 3/29/2025    INDICATION: 2d hx of acute atraumatic right neck pain. Concern for carotidynia, post right cataract surgery  4d earlier. Evaluate for acute vascular process  COMPARISON: None.  CONTRAST: 67 Isovue 370  TECHNIQUE: Head and neck CT angiogram with IV contrast. Noncontrast head CT followed by axial helical CT images of the head and neck vessels obtained during the arterial phase of intravenous contrast administration. Axial 2D reconstructed images and   multiplanar 3D MIP reconstructed images of the head and neck vessels were performed by the technologist. Dose reduction techniques were used. All stenosis measurements made according to NASCET criteria unless otherwise specified.    FINDINGS:   NONCONTRAST HEAD CT:   INTRACRANIAL CONTENTS: No intracranial hemorrhage, extraaxial collection, or mass effect.  No CT evidence of acute infarct. Normal parenchymal attenuation. Normal ventricles and sulci.     VISUALIZED ORBITS/SINUSES/MASTOIDS: Prior right cataract surgery. Visualized portions of the orbits are otherwise unremarkable. No paranasal sinus mucosal disease. No middle ear or mastoid effusion.    BONES/SOFT TISSUES: No acute abnormality.    HEAD CTA:  ANTERIOR CIRCULATION: No stenosis/occlusion, aneurysm, or high flow vascular malformation. Standard [place] anatomy.    POSTERIOR CIRCULATION: No stenosis/occlusion, aneurysm, or high flow vascular malformation. Balanced vertebral arteries supply a normal basilar  artery.     DURAL VENOUS SINUSES: Expected enhancement of the major dural venous sinuses.    NECK CTA:  RIGHT CAROTID: No measurable stenosis or dissection.    LEFT CAROTID: No measurable stenosis or dissection.    VERTEBRAL ARTERIES: No focal stenosis or dissection. Balanced vertebral arteries.    AORTIC ARCH: Classic aortic arch anatomy with no significant stenosis at the origin of the great vessels.    NONVASCULAR STRUCTURES: Unremarkable.      Impression    IMPRESSION:   HEAD CT:  1.  No acute intracranial process.    HEAD CTA:  1.  No significant stenosis, aneurysm, or high flow vascular malformation identified.    NECK CTA:  1.  No hemodynamically significant stenosis within the vessels of the neck.     Basic metabolic panel     Status: Normal   Result Value Ref Range    Sodium 136 135 - 145 mmol/L    Potassium 4.3 3.4 - 5.3 mmol/L    Chloride 98 98 - 107 mmol/L    Carbon Dioxide (CO2) 27 22 - 29 mmol/L    Anion Gap 11 7 - 15 mmol/L    Urea Nitrogen 10.3 8.0 - 23.0 mg/dL    Creatinine 0.96 0.67 - 1.17 mg/dL    GFR Estimate 87 >60 mL/min/1.73m2    Calcium 9.5 8.8 - 10.4 mg/dL    Glucose 92 70 - 99 mg/dL     Assessments & Plan (with Medical Decision Making)   Assessment Summary and Clinical impression: 64-year-old male who presented by car from home with atraumatic right lateral neck discomfort after right cataract eye surgery 4 days prior.  Patient is a  who reported he did an ultrasound at  work and was concerned about velocities obtained.  After discussion with his wife who is also a  they elected to have him come in to be assessed given his recent ophthalmology procedure. Since his cataract surgery has been no change in his vision.  He reports history of mild chronic tinnitus. No difficulty swallowing.  No fever or chills.  History of chiropractic care 2 weeks prior without neck manipulation.  Localized tenderness over the right medial neck superior to the sternocleidomastoid muscle.   No trismus.   Patient was captured to be afebrile, blood pressure 166/94.  96% on room air.  Localized tenderness without warmth no carotid bruit, normal range of motion of the neck, no focal neurologic deficits and normal vision. No red flags.   After discussion about suspicion that he could have cervical adenitis however to exclude vascular emergeny reviewed options for imaging-(ultrasound vs CT) in light of his recent abdominal imaging with contrast a day prior as reported for workup for hematuria. After reviewing risk and benefit including options for care and goals of care patient agreed to proceed with imaging. Head CT and CTA head and neck revealed no acute findings.  Patient was reassured and discharged symptoms of unclear cause. Query cervical adenitis.   with no oropharyngeal concerns or concern for  odontogenic source.    ED course and plan:  Reviewed the medical record.  Visit on 3/14/25.  We had a discussion about goals of care given his clinical exam is reassuring given no trismus, no carotid bruit, absence of red flags specifically warmth or redness, no other accompanying symptoms and reproducible discrete tenderness.  Reviewed my suspicion that symptoms could be due to adenitis given localized tenderness.  After reviewing options for further imaging including ultrasound versus CTA with his expressed concern about vascular emergency CTA was completed.  Normal electrolyte panel .   Head CT and CTA revealed no emergent findings.  See details outlined in radiology report. Patient felt reassured and was discharged with symptoms of uncertain cause. Reviewed reasons to return to be reexamined. Supportive care measures reviewed.  Patient and spouse who later arrived during ED course of care expressed understanding and comfort with the plan of care.      Disclaimer: This note consists of symbols derived from keyboarding, dictation and/or voice recognition software. As a result, there may be errors in the  script that have gone undetected. Please consider this when interpreting information found in this chart.   I have reviewed the nursing notes.    I have reviewed the findings, diagnosis, plan and need for follow up with the patient.           Medical Decision Making  The patient's presentation was of high complexity (advanced age, neck pain, recent workup for hematuria by report with abnormal CT abdomen pelvis contrast with nodules in the lung and bladder pending urology follow-up).    The patient's evaluation involved:  ordering and/or review of 2 test(s) in this encounter (blood work, CT angiogram head and neck)    The patient's management necessitated high risk (neck pain of unclear cause, query adenitis.  Low threshold to return for new concerns).        New Prescriptions    No medications on file       Final diagnoses:   Neck pain on right side - Superior to the right sternocleidomastoid muscle over the last 3 days after cataract surgery       3/29/2025   [facility] EMERGENCY DEPT       Wally Lazo MD  03/29/25 2100

## 2025-03-30 NOTE — DISCHARGE INSTRUCTIONS
1) Your evaluation today did not reveal evidence of a vascular emergency or emergency condition. Imaging did not suggest evidence of  dissection, aneurysm, or other new findings of concern.  Reviewed my suspicion that your tenderness could be related to an inflamed or irritated lymph node or cervical adenitis. Given absence of sore throat, no concern for dental infection with reassuring CT angiogram you  appear stable for discharge to home.    2) We have reviewed that although you appear stable for discharge to home if you develop new symptoms of concern including new visual changes, progressive pain or swelling, or new concerns you should return to be reexamined.    3) Best wishes with planned surgery on Friday- (4/4) and follow-up workup and care with Urology

## 2025-04-09 PROCEDURE — 88342 IMHCHEM/IMCYTCHM 1ST ANTB: CPT | Mod: TC,ORL | Performed by: UROLOGY

## 2025-04-09 PROCEDURE — 88342 IMHCHEM/IMCYTCHM 1ST ANTB: CPT | Mod: 26 | Performed by: PATHOLOGY

## 2025-04-09 PROCEDURE — 88307 TISSUE EXAM BY PATHOLOGIST: CPT | Mod: 26 | Performed by: PATHOLOGY

## 2025-04-10 ENCOUNTER — LAB REQUISITION (OUTPATIENT)
Dept: LAB | Facility: CLINIC | Age: 65
End: 2025-04-10
Payer: COMMERCIAL

## 2025-04-10 DIAGNOSIS — D49.4 NEOPLASM OF UNSPECIFIED BEHAVIOR OF BLADDER: ICD-10-CM

## 2025-04-15 LAB
PATH REPORT.COMMENTS IMP SPEC: ABNORMAL
PATH REPORT.COMMENTS IMP SPEC: YES
PATH REPORT.FINAL DX SPEC: ABNORMAL
PATH REPORT.GROSS SPEC: ABNORMAL
PATH REPORT.MICROSCOPIC SPEC OTHER STN: ABNORMAL
PATH REPORT.MICROSCOPIC SPEC OTHER STN: ABNORMAL
PATH REPORT.RELEVANT HX SPEC: ABNORMAL
PHOTO IMAGE: ABNORMAL

## 2025-05-14 ENCOUNTER — LAB REQUISITION (OUTPATIENT)
Dept: LAB | Facility: CLINIC | Age: 65
End: 2025-05-14
Payer: COMMERCIAL

## 2025-05-14 DIAGNOSIS — C67.9 MALIGNANT NEOPLASM OF BLADDER, UNSPECIFIED (H): ICD-10-CM

## 2025-05-14 PROCEDURE — 88307 TISSUE EXAM BY PATHOLOGIST: CPT | Mod: TC,ORL | Performed by: UROLOGY

## 2025-05-14 PROCEDURE — 88307 TISSUE EXAM BY PATHOLOGIST: CPT | Mod: 26 | Performed by: PATHOLOGY

## 2025-05-16 LAB
PATH REPORT.COMMENTS IMP SPEC: NORMAL
PATH REPORT.COMMENTS IMP SPEC: NORMAL
PATH REPORT.FINAL DX SPEC: NORMAL
PATH REPORT.GROSS SPEC: NORMAL
PATH REPORT.MICROSCOPIC SPEC OTHER STN: NORMAL
PATH REPORT.RELEVANT HX SPEC: NORMAL
PHOTO IMAGE: NORMAL

## (undated) RX ORDER — GLYCOPYRROLATE 0.2 MG/ML
INJECTION, SOLUTION INTRAMUSCULAR; INTRAVENOUS
Status: DISPENSED
Start: 2020-01-31